# Patient Record
Sex: MALE | Race: WHITE | HISPANIC OR LATINO
[De-identification: names, ages, dates, MRNs, and addresses within clinical notes are randomized per-mention and may not be internally consistent; named-entity substitution may affect disease eponyms.]

---

## 2017-01-03 ENCOUNTER — MEDICATION RENEWAL (OUTPATIENT)
Age: 62
End: 2017-01-03

## 2017-01-04 ENCOUNTER — APPOINTMENT (OUTPATIENT)
Dept: GASTROENTEROLOGY | Facility: AMBULATORY MEDICAL SERVICES | Age: 62
End: 2017-01-04

## 2017-05-08 ENCOUNTER — APPOINTMENT (OUTPATIENT)
Dept: CARDIOLOGY | Facility: CLINIC | Age: 62
End: 2017-05-08

## 2017-05-18 ENCOUNTER — TRANSCRIPTION ENCOUNTER (OUTPATIENT)
Age: 62
End: 2017-05-18

## 2017-06-01 ENCOUNTER — APPOINTMENT (OUTPATIENT)
Dept: GASTROENTEROLOGY | Facility: CLINIC | Age: 62
End: 2017-06-01

## 2017-06-01 ENCOUNTER — FORM ENCOUNTER (OUTPATIENT)
Age: 62
End: 2017-06-01

## 2017-06-01 VITALS
TEMPERATURE: 98.6 F | HEART RATE: 83 BPM | WEIGHT: 164 LBS | BODY MASS INDEX: 23.53 KG/M2 | DIASTOLIC BLOOD PRESSURE: 70 MMHG | SYSTOLIC BLOOD PRESSURE: 110 MMHG | OXYGEN SATURATION: 98 %

## 2017-06-02 ENCOUNTER — APPOINTMENT (OUTPATIENT)
Dept: CT IMAGING | Facility: IMAGING CENTER | Age: 62
End: 2017-06-02

## 2017-06-02 ENCOUNTER — OUTPATIENT (OUTPATIENT)
Dept: OUTPATIENT SERVICES | Facility: HOSPITAL | Age: 62
LOS: 1 days | End: 2017-06-02
Payer: COMMERCIAL

## 2017-06-02 ENCOUNTER — MESSAGE (OUTPATIENT)
Age: 62
End: 2017-06-02

## 2017-06-02 DIAGNOSIS — R10.9 UNSPECIFIED ABDOMINAL PAIN: ICD-10-CM

## 2017-06-02 LAB
ALBUMIN SERPL ELPH-MCNC: 4.7 G/DL
ALP BLD-CCNC: 61 U/L
ALT SERPL-CCNC: 25 U/L
AMYLASE/CREAT SERPL: 50 U/L
ANION GAP SERPL CALC-SCNC: 15 MMOL/L
AST SERPL-CCNC: 20 U/L
BASOPHILS # BLD AUTO: 0.01 K/UL
BASOPHILS NFR BLD AUTO: 0.1 %
BILIRUB SERPL-MCNC: 0.5 MG/DL
BUN SERPL-MCNC: 13 MG/DL
CALCIUM SERPL-MCNC: 9.2 MG/DL
CHLORIDE SERPL-SCNC: 103 MMOL/L
CO2 SERPL-SCNC: 23 MMOL/L
CREAT SERPL-MCNC: 0.85 MG/DL
EOSINOPHIL # BLD AUTO: 0.06 K/UL
EOSINOPHIL NFR BLD AUTO: 0.6 %
GLUCOSE SERPL-MCNC: 101 MG/DL
HCT VFR BLD CALC: 42.3 %
HGB BLD-MCNC: 13.9 G/DL
IMM GRANULOCYTES NFR BLD AUTO: 0.2 %
LPL SERPL-CCNC: 41 U/L
LYMPHOCYTES # BLD AUTO: 1.23 K/UL
LYMPHOCYTES NFR BLD AUTO: 12.2 %
MAN DIFF?: NORMAL
MCHC RBC-ENTMCNC: 29.1 PG
MCHC RBC-ENTMCNC: 32.9 GM/DL
MCV RBC AUTO: 88.7 FL
MONOCYTES # BLD AUTO: 0.66 K/UL
MONOCYTES NFR BLD AUTO: 6.5 %
NEUTROPHILS # BLD AUTO: 8.1 K/UL
NEUTROPHILS NFR BLD AUTO: 80.4 %
PLATELET # BLD AUTO: 275 K/UL
POTASSIUM SERPL-SCNC: 4.1 MMOL/L
PROT SERPL-MCNC: 7.5 G/DL
RBC # BLD: 4.77 M/UL
RBC # FLD: 13 %
SODIUM SERPL-SCNC: 141 MMOL/L
WBC # FLD AUTO: 10.08 K/UL

## 2017-06-02 PROCEDURE — 82565 ASSAY OF CREATININE: CPT

## 2017-06-02 PROCEDURE — 74177 CT ABD & PELVIS W/CONTRAST: CPT

## 2017-06-06 ENCOUNTER — MESSAGE (OUTPATIENT)
Age: 62
End: 2017-06-06

## 2017-06-16 ENCOUNTER — APPOINTMENT (OUTPATIENT)
Dept: GASTROENTEROLOGY | Facility: CLINIC | Age: 62
End: 2017-06-16

## 2017-06-16 VITALS
WEIGHT: 160 LBS | BODY MASS INDEX: 22.9 KG/M2 | TEMPERATURE: 98 F | DIASTOLIC BLOOD PRESSURE: 62 MMHG | HEIGHT: 70 IN | SYSTOLIC BLOOD PRESSURE: 110 MMHG | RESPIRATION RATE: 16 BRPM | OXYGEN SATURATION: 99 % | HEART RATE: 88 BPM

## 2017-08-10 ENCOUNTER — APPOINTMENT (OUTPATIENT)
Dept: RADIOLOGY | Facility: IMAGING CENTER | Age: 62
End: 2017-08-10
Payer: COMMERCIAL

## 2017-08-10 ENCOUNTER — APPOINTMENT (OUTPATIENT)
Dept: PULMONOLOGY | Facility: CLINIC | Age: 62
End: 2017-08-10
Payer: COMMERCIAL

## 2017-08-10 ENCOUNTER — OUTPATIENT (OUTPATIENT)
Dept: OUTPATIENT SERVICES | Facility: HOSPITAL | Age: 62
LOS: 1 days | End: 2017-08-10
Payer: COMMERCIAL

## 2017-08-10 VITALS
RESPIRATION RATE: 16 BRPM | WEIGHT: 165 LBS | DIASTOLIC BLOOD PRESSURE: 80 MMHG | TEMPERATURE: 97.3 F | SYSTOLIC BLOOD PRESSURE: 120 MMHG | HEIGHT: 70 IN | HEART RATE: 73 BPM | BODY MASS INDEX: 23.62 KG/M2

## 2017-08-10 DIAGNOSIS — R93.8 ABNORMAL FINDINGS ON DIAGNOSTIC IMAGING OF OTHER SPECIFIED BODY STRUCTURES: ICD-10-CM

## 2017-08-10 DIAGNOSIS — Z00.00 ENCOUNTER FOR GENERAL ADULT MEDICAL EXAMINATION WITHOUT ABNORMAL FINDINGS: ICD-10-CM

## 2017-08-10 PROCEDURE — 94010 BREATHING CAPACITY TEST: CPT

## 2017-08-10 PROCEDURE — 94729 DIFFUSING CAPACITY: CPT

## 2017-08-10 PROCEDURE — 94726 PLETHYSMOGRAPHY LUNG VOLUMES: CPT

## 2017-08-10 PROCEDURE — ZZZZZ: CPT

## 2017-08-10 PROCEDURE — 99214 OFFICE O/P EST MOD 30 MIN: CPT | Mod: 25

## 2017-08-10 PROCEDURE — 71020: CPT | Mod: 26

## 2017-08-10 PROCEDURE — 71046 X-RAY EXAM CHEST 2 VIEWS: CPT

## 2017-08-18 ENCOUNTER — APPOINTMENT (OUTPATIENT)
Dept: NEUROLOGY | Facility: CLINIC | Age: 62
End: 2017-08-18
Payer: COMMERCIAL

## 2017-08-18 VITALS
HEIGHT: 70.5 IN | SYSTOLIC BLOOD PRESSURE: 125 MMHG | BODY MASS INDEX: 22.93 KG/M2 | HEART RATE: 71 BPM | DIASTOLIC BLOOD PRESSURE: 92 MMHG | WEIGHT: 162 LBS

## 2017-08-18 PROCEDURE — 99204 OFFICE O/P NEW MOD 45 MIN: CPT

## 2017-08-21 ENCOUNTER — APPOINTMENT (OUTPATIENT)
Dept: PULMONOLOGY | Facility: CLINIC | Age: 62
End: 2017-08-21

## 2017-10-09 ENCOUNTER — RX RENEWAL (OUTPATIENT)
Age: 62
End: 2017-10-09

## 2018-06-07 ENCOUNTER — APPOINTMENT (OUTPATIENT)
Dept: GASTROENTEROLOGY | Facility: CLINIC | Age: 63
End: 2018-06-07

## 2018-06-08 ENCOUNTER — APPOINTMENT (OUTPATIENT)
Dept: GASTROENTEROLOGY | Facility: CLINIC | Age: 63
End: 2018-06-08
Payer: COMMERCIAL

## 2018-06-08 VITALS
OXYGEN SATURATION: 99 % | SYSTOLIC BLOOD PRESSURE: 122 MMHG | BODY MASS INDEX: 31.8 KG/M2 | WEIGHT: 162 LBS | DIASTOLIC BLOOD PRESSURE: 74 MMHG | HEIGHT: 60 IN | RESPIRATION RATE: 15 BRPM | HEART RATE: 70 BPM | TEMPERATURE: 97.9 F

## 2018-06-08 DIAGNOSIS — R10.9 UNSPECIFIED ABDOMINAL PAIN: ICD-10-CM

## 2018-06-08 DIAGNOSIS — K59.00 CONSTIPATION, UNSPECIFIED: ICD-10-CM

## 2018-06-08 DIAGNOSIS — K64.8 OTHER HEMORRHOIDS: ICD-10-CM

## 2018-06-08 PROCEDURE — 99214 OFFICE O/P EST MOD 30 MIN: CPT

## 2019-01-02 ENCOUNTER — APPOINTMENT (OUTPATIENT)
Dept: UROLOGY | Facility: CLINIC | Age: 64
End: 2019-01-02

## 2019-02-05 ENCOUNTER — APPOINTMENT (OUTPATIENT)
Dept: ORTHOPEDIC SURGERY | Facility: CLINIC | Age: 64
End: 2019-02-05

## 2019-06-20 ENCOUNTER — APPOINTMENT (OUTPATIENT)
Dept: OPHTHALMOLOGY | Facility: CLINIC | Age: 64
End: 2019-06-20
Payer: COMMERCIAL

## 2019-06-20 ENCOUNTER — NON-APPOINTMENT (OUTPATIENT)
Age: 64
End: 2019-06-20

## 2019-06-20 DIAGNOSIS — H40.003 PREGLAUCOMA, UNSPECIFIED, BILATERAL: ICD-10-CM

## 2019-06-20 DIAGNOSIS — H25.093 OTHER AGE-RELATED INCIPIENT CATARACT, BILATERAL: ICD-10-CM

## 2019-06-20 DIAGNOSIS — H35.373 PUCKERING OF MACULA, BILATERAL: ICD-10-CM

## 2019-06-20 PROCEDURE — 76514 ECHO EXAM OF EYE THICKNESS: CPT

## 2019-06-20 PROCEDURE — 92004 COMPRE OPH EXAM NEW PT 1/>: CPT

## 2019-06-20 PROCEDURE — 92133 CPTRZD OPH DX IMG PST SGM ON: CPT

## 2019-06-24 ENCOUNTER — NON-APPOINTMENT (OUTPATIENT)
Age: 64
End: 2019-06-24

## 2019-06-24 ENCOUNTER — APPOINTMENT (OUTPATIENT)
Dept: OPHTHALMOLOGY | Facility: CLINIC | Age: 64
End: 2019-06-24
Payer: COMMERCIAL

## 2019-06-24 PROCEDURE — 92083 EXTENDED VISUAL FIELD XM: CPT

## 2019-07-09 ENCOUNTER — APPOINTMENT (OUTPATIENT)
Dept: NEUROLOGY | Facility: CLINIC | Age: 64
End: 2019-07-09
Payer: COMMERCIAL

## 2019-07-09 VITALS
SYSTOLIC BLOOD PRESSURE: 113 MMHG | DIASTOLIC BLOOD PRESSURE: 76 MMHG | BODY MASS INDEX: 25.48 KG/M2 | HEIGHT: 70 IN | HEART RATE: 80 BPM | WEIGHT: 178 LBS

## 2019-07-09 PROCEDURE — 99213 OFFICE O/P EST LOW 20 MIN: CPT

## 2019-07-09 NOTE — ASSESSMENT
[FreeTextEntry1] : Patient continues to have what sounds to be migraine equivalents, in the setting of a normal neuro examination.  \par \par Patient agrees to try elavil 10mg QHS and see if that helps\par \par

## 2019-07-09 NOTE — HISTORY OF PRESENT ILLNESS
[FreeTextEntry1] : Last saw patient two years ago for migraine equivalent, he deferred treatment at the time.  \par \par For the past 6 months he has been getting tingling left face and left arm about once weekly, lasting about two hours and more recently for three days, and now into left index fingers.  Gets very mild HA about every three months.  Left arm is not week, no change in urinary freq. No vision loss.

## 2019-07-09 NOTE — PHYSICAL EXAM
[Person] : oriented to person [Place] : oriented to place [Remote Intact] : remote memory intact [Short Term Intact] : short term memory intact [Time] : oriented to time [Registration Intact] : recent registration memory intact [Visual Intact] : visual attention was ~T not ~L decreased [Concentration Intact] : normal concentrating ability [Writing A Sentence] : no difficulty writing a sentence [Naming Objects] : no difficulty naming common objects [Repeating Phrases] : no difficulty repeating a phrase [Reading] : reading intact [Fluency] : fluency intact [Comprehension] : comprehension intact [Cranial Nerves Optic (II)] : visual acuity intact bilaterally,  visual fields full to confrontation, pupils equal round and reactive to light [Past History] : adequate knowledge of personal past history [Cranial Nerves Trigeminal (V)] : facial sensation intact symmetrically [Cranial Nerves Oculomotor (III)] : extraocular motion intact [Cranial Nerves Facial (VII)] : face symmetrical [Cranial Nerves Glossopharyngeal (IX)] : tongue and palate midline [Cranial Nerves Accessory (XI - Cranial And Spinal)] : head turning and shoulder shrug symmetric [Cranial Nerves Vestibulocochlear (VIII)] : hearing was intact bilaterally [No Muscle Atrophy] : normal bulk in all four extremities [Motor Strength] : muscle strength was normal in all four extremities [Motor Tone] : muscle tone was normal in all four extremities [Cranial Nerves Hypoglossal (XII)] : there was no tongue deviation with protrusion [Sensation Tactile Decrease] : light touch was intact [Motor Handedness Right-Handed] : the patient is right hand dominant [Balance] : balance was intact [Abnormal Walk] : normal gait [2+] : Patella left 2+ [Past-pointing] : there was no past-pointing [Tremor] : no tremor present [Plantar Reflex Right Only] : normal on the right [Plantar Reflex Left Only] : normal on the left

## 2019-08-06 ENCOUNTER — APPOINTMENT (OUTPATIENT)
Dept: OPHTHALMOLOGY | Facility: CLINIC | Age: 64
End: 2019-08-06
Payer: COMMERCIAL

## 2019-08-06 ENCOUNTER — NON-APPOINTMENT (OUTPATIENT)
Age: 64
End: 2019-08-06

## 2019-08-06 PROCEDURE — 92014 COMPRE OPH EXAM EST PT 1/>: CPT

## 2019-08-06 PROCEDURE — 92083 EXTENDED VISUAL FIELD XM: CPT

## 2019-08-06 PROCEDURE — 92133 CPTRZD OPH DX IMG PST SGM ON: CPT

## 2019-08-06 PROCEDURE — ZZZZZ: CPT

## 2019-08-21 ENCOUNTER — FORM ENCOUNTER (OUTPATIENT)
Age: 64
End: 2019-08-21

## 2019-08-22 ENCOUNTER — OUTPATIENT (OUTPATIENT)
Dept: OUTPATIENT SERVICES | Facility: HOSPITAL | Age: 64
LOS: 1 days | End: 2019-08-22
Payer: COMMERCIAL

## 2019-08-22 ENCOUNTER — APPOINTMENT (OUTPATIENT)
Dept: ULTRASOUND IMAGING | Facility: CLINIC | Age: 64
End: 2019-08-22
Payer: COMMERCIAL

## 2019-08-22 DIAGNOSIS — Z00.8 ENCOUNTER FOR OTHER GENERAL EXAMINATION: ICD-10-CM

## 2019-08-22 PROCEDURE — 93880 EXTRACRANIAL BILAT STUDY: CPT

## 2019-08-22 PROCEDURE — 93880 EXTRACRANIAL BILAT STUDY: CPT | Mod: 26

## 2019-08-28 ENCOUNTER — RESULT REVIEW (OUTPATIENT)
Age: 64
End: 2019-08-28

## 2019-09-03 ENCOUNTER — APPOINTMENT (OUTPATIENT)
Dept: ORTHOPEDIC SURGERY | Facility: CLINIC | Age: 64
End: 2019-09-03
Payer: COMMERCIAL

## 2019-09-03 DIAGNOSIS — M54.12 RADICULOPATHY, CERVICAL REGION: ICD-10-CM

## 2019-09-03 DIAGNOSIS — G57.02 LESION OF SCIATIC NERVE, LEFT LOWER LIMB: ICD-10-CM

## 2019-09-03 PROCEDURE — 99204 OFFICE O/P NEW MOD 45 MIN: CPT

## 2019-09-03 NOTE — PHYSICAL EXAM
[de-identified] : Constitutional: Well-nourished, well-developed, No acute distress\par Respiratory:  Good respiratory effort, no SOB\par Lymphatic: No regional lymphadenopathy, no lymphedema\par Psychiatric: Pleasant and normal affect, alert and oriented x3\par Skin: Clean dry and intact B/L UE/LE\par Musculoskeletal: normal except where as noted in regional exam\par \par Cervical Spine Exam\par APPEARANCE: no marked deformities or malalignment, normal curvature, good posture\par POSITIVE TENDERNESS: + Bilateral upper trapezius, levator scapula, rhomboid major, and rhomboid minor, + spasm noted in the same muscles.\par NONTENDER: no bony midline tenderness.\par ROM: limited in all planes, most notably in flexion and sidebending due to pain\par RESISTIVE TESTING: painful 4/5 resisted ext, bilateral sidebending, rotation and shoulder shrug , 5/5 flexion \par SPECIAL TESTS: neg Spurling's b/l\par Vasc: 2+ radial pulse b/l\par Neuro: C5 - T1 intact to motor, DTRs 2+/4 biceps, triceps, brachioradialis\par Sensation: Intact to light touch throughout b/l UE\par Thoracic spine:  normal curvature and normal alignment. good posture. no midline bony tenderness, no marked spasm. no marked tenderness in paraspinal muscles.  ROM full & painless all planes\par B/L Shoulders:  No asymmetry, malalignment, or swelling, Full ROM, 5/5 strength in flexion/ext, Abd/Add, IR/ER, Joints stable\par B/L Elbows:  No asymmetry, malalignment, or swelling, Full ROM, 5/5 strength in flexion/ext, pronation/supination, Joints stable\par B/L Wrist and Hand:  No asymmetry, malalignment, or swelling, Full ROM, 5/5 strength in wrist and long finger flexion/ext, radial/ulnar deviation, Joints stable\par \par \par \par Right Hip:\par \par APPEARANCE: no marked deformities, no swelling or malalignment\par POSITIVE TENDERNESS: none\par NONTENDER greater trochanter, TFL, gluteal region, ischium/proximal hamstring region, hip flexor region, sartorius and pubic symphysis. \par ROM full, painless all planes. \par RESISTIVE TESTING: painless resisted ER/IR/SLR/abduction/adduction. \par SPECIAL TESTS: painless loaded flexion & scouring\par PULSES: 2+ DP/PT pulses\par Neuro:  NL sensation of thigh and lower extremity, DTRs 2+/4 patella and achilles\par \par \par B/L Knees: No asymmetry, malalignment, or swelling, Full ROM, 5/5 strength in flexion/ext, Joints stable\par B/L Ankles: No asymmetry, malalignment, or swelling, Full ROM, 5/5 strength in DF/PF/Inv/Ev, Joints stable\par BIOMECHANICAL EXAM: no marked leg length discrepancy, moderate hip abductor weakness b/l, no marked foot pronation, tight hams and ITB b/l.  Normal gait and station\par \par Left Hip:\par \par APPEARANCE: no marked deformities, no swelling or malalignment\par POSITIVE TENDERNESS: + Piriformis at midsubstance and at its insertion on the greater trochanter\par NONTENDER: greater trochanter, trochanteric bursa, TFL, gluteal region, ischium/proximal hamstring region, hip flexor region, sartorius and pubic symphysis. \par ROM: full & painless. \par RESISTIVE TESTING: Mild pain with resisted hip ER, particularly at 90 of hip flexion, painless resisted IR/SLR/abduction/adduction. \par SPECIAL TESTS: NATHANIEL reproduces mild pain and tightness in the buttock region, neg FADIR, painless loaded flexion & scouring. neg hop test & fulcrum test\par PULSES: 2+ DP/PT pulses\par Neuro:  NL sensation of thigh and lower extremity, DTRs 2+/4 patella and achilles\par \par

## 2019-09-03 NOTE — HISTORY OF PRESENT ILLNESS
[de-identified] : Patient is here for left leg pain that began several months ago when he fell while running and twisted his leg. He states that the pain has improved over time with no treatment but he continues to have some pain when he is driving for extended periods of time. He states that if he is in the car for over 2 hours he will get radiating pain with occasional numbness and tingling. \par \par He also complains of numbness/tingling on the left side of his upper body/jaw. He states that he has seen 2 neurologists for this in the past, his most recent visit was in 07/2019

## 2019-09-03 NOTE — DISCUSSION/SUMMARY
[de-identified] : Discussed findings of today's exam and possible causes of patient's pain.  Educated patient on their most probable diagnosis of left buttock pain with intermittent lower extremity radiculopathy, likely secondary to piriformis syndrome.  Reviewed possible courses of treatment, and we collaboratively decided best course of treatment at this time will include conservative management.  Patient will be started on a course of physical therapy to restore normal range of motion and strength as tolerated.\par Patient is also requesting evaluation of chronic left-sided face and neck/upper extremity pain. He was recently evaluated by neurology and told he has slightly migraine headaches with possible nerve irritation subsequent to that etiology. I advised the patient that cervical disc herniation or etiology can cause left upper extremity pain, but it does not cause facial numbness. That is above the level of the cervical spine. Recommend addition a cervical spine issue to his course of physical therapy, if this helps improve his left upper extremity issue, that would confirm that he has some level of cervical etiology. If not we may consider x-ray and/or MRI of the cervical spine to evaluate for left-sided herniation, with no structural etiology found to be at advised to continue management with neurology.  Follow up as needed.  Patient appreciates and agrees with current plan.\par \par This note was generated using dragon medical dictation software.  A reasonable effort has been made for proofreading its contents, but typos may still remain.  If there are any questions or points of clarification needed please notify my office.

## 2019-12-16 ENCOUNTER — APPOINTMENT (OUTPATIENT)
Dept: OTOLARYNGOLOGY | Facility: CLINIC | Age: 64
End: 2019-12-16
Payer: COMMERCIAL

## 2019-12-16 VITALS
HEIGHT: 70 IN | HEART RATE: 83 BPM | SYSTOLIC BLOOD PRESSURE: 124 MMHG | DIASTOLIC BLOOD PRESSURE: 82 MMHG | BODY MASS INDEX: 25.48 KG/M2 | WEIGHT: 178 LBS

## 2019-12-16 PROCEDURE — 92557 COMPREHENSIVE HEARING TEST: CPT

## 2019-12-16 PROCEDURE — 99204 OFFICE O/P NEW MOD 45 MIN: CPT | Mod: 25

## 2019-12-16 PROCEDURE — 92567 TYMPANOMETRY: CPT

## 2019-12-16 NOTE — DATA REVIEWED
[de-identified] : left SNHL w/ excellent SDS\par \par Hearing Test performed to evaluate the extent of hearing loss and asymmetry to explain pt's symptoms\par

## 2019-12-16 NOTE — REASON FOR VISIT
[Initial Evaluation] : an initial evaluation for [Hearing Loss] : hearing loss [Tinnitus] : tinnitus [Vertigo] : vertigo

## 2019-12-16 NOTE — PHYSICAL EXAM
[Hearing Loss Right Only] : normal [Hearing Loss Left Only] : normal [Nystagmus] : ~T no ~M nystagmus was seen [Fistula Sign] : Fistula Sign: Negative [Fukuda Step Test] : Fukuda Step Test was Negative [Bibi-Hallandréske] : [place]-Hallpike: Negative [de-identified] : wnl [] : septum deviated to the left [Midline] : trachea located in midline position [Normal] : no rashes

## 2019-12-16 NOTE — ASSESSMENT
[FreeTextEntry1] : Left labyrinthitis and left SNHL\par Rx-medrol dospak\par May need a 2 week taper of Prednisone and MRI\par rec low salt diet and avoid loud noises\par f/u in several days

## 2019-12-23 ENCOUNTER — FORM ENCOUNTER (OUTPATIENT)
Age: 64
End: 2019-12-23

## 2019-12-23 ENCOUNTER — APPOINTMENT (OUTPATIENT)
Dept: OTOLARYNGOLOGY | Facility: CLINIC | Age: 64
End: 2019-12-23
Payer: COMMERCIAL

## 2019-12-23 VITALS
DIASTOLIC BLOOD PRESSURE: 84 MMHG | HEART RATE: 72 BPM | BODY MASS INDEX: 25.48 KG/M2 | WEIGHT: 178 LBS | HEIGHT: 70 IN | SYSTOLIC BLOOD PRESSURE: 123 MMHG

## 2019-12-23 PROCEDURE — 99214 OFFICE O/P EST MOD 30 MIN: CPT

## 2019-12-23 NOTE — HISTORY OF PRESENT ILLNESS
[No] : patient does not have a  history of radiation therapy [de-identified] : 12/23/19-pt reports temporary improvement of sx after 2 days of steroids then sx returned in full\par 12/16/1962 yo male\par 1-2 day h/o moderate vertigo and imbalance w/ assoc left sided hearing loss and tinnitus\par no prior h/o of same\par no trauma\par No recent URI or salty meal\par no other modifying factors\par no nasal or throat complaints\par  [Tinnitus] : tinnitus [Vertigo] : vertigo [Anxiety] : no anxiety [Hearing Loss] : hearing loss [Headache] : no headache [Neurologic Symptoms] : no associated neurologic symptoms [Orthostatic Hypotension] : no orthostatic hypotension [Otalgia] : no otalgia [Recurrent Otitis Media] : no recurrent otitis media [Meniere Disease] : no Meniere disease [Otitis Media with Effusion] : no otitis media with effusion [Eustachian Tube Dysfunction] : no eustachian tube dysfunction [Cholesteatoma] : no cholesteatoma [Otosclerosis] : no otosclerosis [Hypertension] : no hypertension [Autoimmune Diseases] : no autoimmune diseases [Perilymphatic Fistula] : no perilymphatic fistula [Loud Noise Exposure] : no history of loud noise exposure [Hypotension] : no hypotension [Smoking] : no smoking [Stroke] : no stroke [Early Onset Hearing Loss] : no early onset hearing loss [Facial Pain] : no facial pain [Facial Pressure] : no facial pressure [Nasal Congestion] : no nasal congestion [Diplopia] : no diplopia [Ear Fullness] : no ear fullness [Allergic Rhinitis] : no allergic rhinitis [Septal Deviation] : septal deviation [Seasonal Allergies] : no seasonal allergies [Environmental Allergies] : no environmental allergies [Allergies] : no allergies [Environmental Allergens] : no environmental allergens [Adenoidectomy] : no adenoidectomy [Asthma] : no asthma [Neck Mass] : no neck mass [Neck Pain] : no neck pain [Chills] : no chills [Cold Intolerance] : no cold intolerance [Cough] : no cough [Fatigue] : no fatigue [Hyperthyroidism] : no hyperthyroidism [Heat Intolerance] : no heat intolerance [Sialadenitis] : no sialadenitis [Hodgkin Disease] : no hodgkin disease [Non-Hodgkin Lymphoma] : no non-hodgkin lymphoma [Tobacco Use] : no tobacco use [Alcohol Use] : no alcohol use [Thyroid Cancer] : no thyroid cancer [Graves Disease] : no graves disease

## 2019-12-23 NOTE — REASON FOR VISIT
[Subsequent Evaluation] : a subsequent evaluation for [Tinnitus] : tinnitus [Hearing Loss] : hearing loss [Vertigo] : vertigo

## 2019-12-23 NOTE — DATA REVIEWED
[de-identified] : left SNHL w/ excellent SDS\par \par Hearing Test performed to evaluate the extent of hearing loss and asymmetry to explain pt's symptoms\par

## 2019-12-23 NOTE — PHYSICAL EXAM
[] : septum deviated to the left [Midline] : trachea located in midline position [Hearing Loss Left Only] : normal [Hearing Loss Right Only] : normal [Nystagmus] : ~T no ~M nystagmus was seen [Normal] : horizontal positional vertigo maneuver was normal [Fukuda Step Test] : Fukuda Step Test was Negative [Bibi-Hallandréske] : [place]-Hallpike: Negative [Fistula Sign] : Fistula Sign: Negative [de-identified] : wnl

## 2019-12-23 NOTE — ASSESSMENT
[FreeTextEntry1] : Left labyrinthitis and left SNHL\par Rx-2 steroid taper 60 mg first week and tums BID\par   MRI of IAC's\par rec low salt diet and avoid loud noises\par f/u in 2 weeks

## 2019-12-24 ENCOUNTER — OUTPATIENT (OUTPATIENT)
Dept: OUTPATIENT SERVICES | Facility: HOSPITAL | Age: 64
LOS: 1 days | End: 2019-12-24
Payer: COMMERCIAL

## 2019-12-24 ENCOUNTER — APPOINTMENT (OUTPATIENT)
Dept: MRI IMAGING | Facility: CLINIC | Age: 64
End: 2019-12-24
Payer: COMMERCIAL

## 2019-12-24 DIAGNOSIS — Z00.8 ENCOUNTER FOR OTHER GENERAL EXAMINATION: ICD-10-CM

## 2019-12-24 PROCEDURE — A9585: CPT

## 2019-12-24 PROCEDURE — 70553 MRI BRAIN STEM W/O & W/DYE: CPT | Mod: 26

## 2019-12-24 PROCEDURE — 70553 MRI BRAIN STEM W/O & W/DYE: CPT

## 2019-12-30 ENCOUNTER — APPOINTMENT (OUTPATIENT)
Dept: OTOLARYNGOLOGY | Facility: CLINIC | Age: 64
End: 2019-12-30

## 2020-01-08 ENCOUNTER — APPOINTMENT (OUTPATIENT)
Dept: OTOLARYNGOLOGY | Facility: CLINIC | Age: 65
End: 2020-01-08
Payer: COMMERCIAL

## 2020-01-08 VITALS
HEIGHT: 70 IN | BODY MASS INDEX: 25.48 KG/M2 | WEIGHT: 178 LBS | SYSTOLIC BLOOD PRESSURE: 122 MMHG | HEART RATE: 105 BPM | DIASTOLIC BLOOD PRESSURE: 80 MMHG

## 2020-01-08 PROCEDURE — 92557 COMPREHENSIVE HEARING TEST: CPT

## 2020-01-08 PROCEDURE — 99214 OFFICE O/P EST MOD 30 MIN: CPT | Mod: 25

## 2020-01-08 PROCEDURE — 92567 TYMPANOMETRY: CPT

## 2020-01-08 NOTE — REASON FOR VISIT
[Subsequent Evaluation] : a subsequent evaluation for [Hearing Loss] : hearing loss [Tinnitus] : tinnitus [Vertigo] : vertigo

## 2020-01-08 NOTE — ASSESSMENT
[FreeTextEntry1] : Left labyrinthitis and left SNHL r/o Meniere's\par Rx-Acupuncture\par Lipoflavanoid\par low salt diet\par  dyazide\par mult questions answered\par f/u 1 month

## 2020-01-08 NOTE — PHYSICAL EXAM
[] : septum deviated to the left [Midline] : trachea located in midline position [Normal] : gait was normal [Nystagmus] : ~T no ~M nystagmus was seen [Fukuda Step Test] : Fukuda Step Test was Negative [Fistula Sign] : Fistula Sign: Negative [de-identified] : wnl [Bibi-Hallandréske] : [place]-Hallpike: Negative

## 2020-01-08 NOTE — DATA REVIEWED
[de-identified] : left SNHL w/ excellent SDS-no change fro 2 weeks ago\par \par Hearing Test performed to evaluate the extent of hearing loss and asymmetry to explain pt's symptoms\par  [de-identified] : MRI-wnl

## 2020-01-08 NOTE — HISTORY OF PRESENT ILLNESS
[No] : patient does not have a  history of radiation therapy [Vertigo] : vertigo [Tinnitus] : tinnitus [Hearing Loss] : hearing loss [Septal Deviation] : septal deviation [de-identified] : 1/8/2020-vertigo resolved w/ 2 week of steroid tx\par stilll w/ left tinnitus and aural fullness\par poss hearing improved\par 12/23/19-pt reports temporary improvement of sx after 2 days of steroids then sx returned in full\par 12/16/1962 yo male\par 1-2 day h/o moderate vertigo and imbalance w/ assoc left sided hearing loss and tinnitus\par no prior h/o of same\par no trauma\par No recent URI or salty meal\par no other modifying factors\par no nasal or throat complaints\par  [Anxiety] : no anxiety [Headache] : no headache [Neurologic Symptoms] : no associated neurologic symptoms [Orthostatic Hypotension] : no orthostatic hypotension [Otalgia] : no otalgia [Recurrent Otitis Media] : no recurrent otitis media [Otitis Media with Effusion] : no otitis media with effusion [Meniere Disease] : no Meniere disease [Eustachian Tube Dysfunction] : no eustachian tube dysfunction [Otosclerosis] : no otosclerosis [Cholesteatoma] : no cholesteatoma [Perilymphatic Fistula] : no perilymphatic fistula [Autoimmune Diseases] : no autoimmune diseases [Hypertension] : no hypertension [Hypotension] : no hypotension [Loud Noise Exposure] : no history of loud noise exposure [Smoking] : no smoking [Early Onset Hearing Loss] : no early onset hearing loss [Stroke] : no stroke [Facial Pain] : no facial pain [Facial Pressure] : no facial pressure [Nasal Congestion] : no nasal congestion [Diplopia] : no diplopia [Ear Fullness] : no ear fullness [Allergic Rhinitis] : no allergic rhinitis [Environmental Allergies] : no environmental allergies [Seasonal Allergies] : no seasonal allergies [Environmental Allergens] : no environmental allergens [Adenoidectomy] : no adenoidectomy [Asthma] : no asthma [Allergies] : no allergies [Neck Mass] : no neck mass [Neck Pain] : no neck pain [Chills] : no chills [Cold Intolerance] : no cold intolerance [Cough] : no cough [Fatigue] : no fatigue [Heat Intolerance] : no heat intolerance [Hyperthyroidism] : no hyperthyroidism [Sialadenitis] : no sialadenitis [Hodgkin Disease] : no hodgkin disease [Non-Hodgkin Lymphoma] : no non-hodgkin lymphoma [Tobacco Use] : no tobacco use [Alcohol Use] : no alcohol use [Graves Disease] : no graves disease [Thyroid Cancer] : no thyroid cancer

## 2020-01-20 ENCOUNTER — MESSAGE (OUTPATIENT)
Age: 65
End: 2020-01-20

## 2020-01-27 ENCOUNTER — APPOINTMENT (OUTPATIENT)
Dept: OPHTHALMOLOGY | Facility: CLINIC | Age: 65
End: 2020-01-27

## 2020-02-03 RX ORDER — AMITRIPTYLINE HYDROCHLORIDE 10 MG/1
10 TABLET, FILM COATED ORAL
Qty: 30 | Refills: 5 | Status: ACTIVE | COMMUNITY
Start: 2019-07-09 | End: 1900-01-01

## 2020-02-10 ENCOUNTER — APPOINTMENT (OUTPATIENT)
Dept: NEUROLOGY | Facility: CLINIC | Age: 65
End: 2020-02-10
Payer: COMMERCIAL

## 2020-02-10 VITALS
DIASTOLIC BLOOD PRESSURE: 84 MMHG | HEART RATE: 81 BPM | WEIGHT: 179 LBS | HEIGHT: 70 IN | BODY MASS INDEX: 25.62 KG/M2 | SYSTOLIC BLOOD PRESSURE: 143 MMHG

## 2020-02-10 PROCEDURE — 99213 OFFICE O/P EST LOW 20 MIN: CPT

## 2020-02-10 NOTE — PHYSICAL EXAM
[Person] : oriented to person [Place] : oriented to place [Time] : oriented to time [Short Term Intact] : short term memory intact [Remote Intact] : remote memory intact [Registration Intact] : recent registration memory intact [Concentration Intact] : normal concentrating ability [Visual Intact] : visual attention was ~T not ~L decreased [Repeating Phrases] : no difficulty repeating a phrase [Naming Objects] : no difficulty naming common objects [Writing A Sentence] : no difficulty writing a sentence [Comprehension] : comprehension intact [Fluency] : fluency intact [Cranial Nerves Optic (II)] : visual acuity intact bilaterally,  visual fields full to confrontation, pupils equal round and reactive to light [Past History] : adequate knowledge of personal past history [Reading] : reading intact [Cranial Nerves Oculomotor (III)] : extraocular motion intact [Cranial Nerves Vestibulocochlear (VIII)] : hearing was intact bilaterally [Cranial Nerves Trigeminal (V)] : facial sensation intact symmetrically [Cranial Nerves Facial (VII)] : face symmetrical [Cranial Nerves Accessory (XI - Cranial And Spinal)] : head turning and shoulder shrug symmetric [Cranial Nerves Glossopharyngeal (IX)] : tongue and palate midline [Cranial Nerves Hypoglossal (XII)] : there was no tongue deviation with protrusion [Motor Tone] : muscle tone was normal in all four extremities [Motor Strength] : muscle strength was normal in all four extremities [No Muscle Atrophy] : normal bulk in all four extremities [Sensation Tactile Decrease] : light touch was intact [Motor Handedness Right-Handed] : the patient is right hand dominant [Balance] : balance was intact [Abnormal Walk] : normal gait [Tremor] : no tremor present [Past-pointing] : there was no past-pointing [2+] : Ankle jerk right 2+ [Plantar Reflex Right Only] : normal on the right [Plantar Reflex Left Only] : normal on the left

## 2020-02-10 NOTE — HISTORY OF PRESENT ILLNESS
[FreeTextEntry1] : Patient did not try the elavil after last visit in July because the symptoms went away.  Recently HA recurred with tingling both sides, worse with stormy weather.  He tolerates elavil, sleeps slightly better, no SE's.  \par \par HA's mild and go away with ASA

## 2020-02-24 ENCOUNTER — APPOINTMENT (OUTPATIENT)
Dept: OTOLARYNGOLOGY | Facility: CLINIC | Age: 65
End: 2020-02-24

## 2020-03-06 ENCOUNTER — APPOINTMENT (OUTPATIENT)
Dept: OPHTHALMOLOGY | Facility: CLINIC | Age: 65
End: 2020-03-06
Payer: COMMERCIAL

## 2020-03-06 ENCOUNTER — NON-APPOINTMENT (OUTPATIENT)
Age: 65
End: 2020-03-06

## 2020-03-06 PROCEDURE — 92083 EXTENDED VISUAL FIELD XM: CPT

## 2020-03-06 PROCEDURE — 92012 INTRM OPH EXAM EST PATIENT: CPT

## 2020-03-06 PROCEDURE — 92133 CPTRZD OPH DX IMG PST SGM ON: CPT

## 2020-04-25 ENCOUNTER — MESSAGE (OUTPATIENT)
Age: 65
End: 2020-04-25

## 2020-05-08 ENCOUNTER — APPOINTMENT (OUTPATIENT)
Dept: DISASTER EMERGENCY | Facility: CLINIC | Age: 65
End: 2020-05-08

## 2020-05-09 LAB
SARS-COV-2 IGG SERPL IA-ACNC: <0.1 INDEX
SARS-COV-2 IGG SERPL QL IA: NEGATIVE

## 2020-05-22 ENCOUNTER — APPOINTMENT (OUTPATIENT)
Dept: NEUROLOGY | Facility: CLINIC | Age: 65
End: 2020-05-22

## 2020-06-01 ENCOUNTER — APPOINTMENT (OUTPATIENT)
Dept: OTOLARYNGOLOGY | Facility: CLINIC | Age: 65
End: 2020-06-01
Payer: COMMERCIAL

## 2020-06-01 VITALS
DIASTOLIC BLOOD PRESSURE: 78 MMHG | TEMPERATURE: 98 F | WEIGHT: 178 LBS | HEART RATE: 77 BPM | SYSTOLIC BLOOD PRESSURE: 122 MMHG | BODY MASS INDEX: 25.48 KG/M2 | HEIGHT: 70 IN

## 2020-06-01 DIAGNOSIS — G43.109 MIGRAINE WITH AURA, NOT INTRACTABLE, W/OUT STATUS MIGRAINOSUS: ICD-10-CM

## 2020-06-01 DIAGNOSIS — H90.3 SENSORINEURAL HEARING LOSS, BILATERAL: ICD-10-CM

## 2020-06-01 DIAGNOSIS — M26.609 UNSPECIFIED TEMPOROMANDIBULAR JOINT DISORDER: ICD-10-CM

## 2020-06-01 DIAGNOSIS — H93.12 TINNITUS, LEFT EAR: ICD-10-CM

## 2020-06-01 DIAGNOSIS — J34.2 DEVIATED NASAL SEPTUM: ICD-10-CM

## 2020-06-01 PROCEDURE — 99214 OFFICE O/P EST MOD 30 MIN: CPT

## 2020-06-01 NOTE — ASSESSMENT
[FreeTextEntry1] : Left SNHL and tinnitus:\par -cleared for hearing aids \par -Avoid loud noise exposure \par stress reduction\par \par TMJ:\par -soft food diet \par -dental evaluation \par -Tylenol for pain \par -warm and cold compresses\par \par f/u 6 months or prn

## 2020-06-01 NOTE — DATA REVIEWED
[de-identified] : left SNHL w/ excellent SDS-no change fro 2 weeks ago\par \par Hearing Test performed to evaluate the extent of hearing loss and asymmetry to explain pt's symptoms\par  [de-identified] : MRI-wnl

## 2020-06-01 NOTE — PHYSICAL EXAM
[] : septum deviated to the left [Midline] : trachea located in midline position [Normal] : horizontal positional vertigo maneuver was normal [de-identified] : b.l crepitus  [Nystagmus] : ~T no ~M nystagmus was seen [Fukuda Step Test] : Fukuda Step Test was Negative [Fistula Sign] : Fistula Sign: Negative [Bibi-Hallandréske] : [place]-Hallpike: Negative [de-identified] : wnl

## 2020-06-01 NOTE — HISTORY OF PRESENT ILLNESS
[No] : patient does not have a  history of radiation therapy [Tinnitus] : tinnitus [Vertigo] : vertigo [Hearing Loss] : hearing loss [Septal Deviation] : septal deviation [de-identified] : 64 yo male\par Patient with hx of hearing loss, tinnitus and intermittent vertigo which has now resolved w/amytriptoline presents for follow up.  No change in hearing since last visit. Continues to have bilateral tinnitus, taking Lipoflavonoid daily with no relief.\par Pt has no ear pain, ear drainage, nasal congestion, nasal discharge, epistaxis, sinus infections, facial pain, facial pressure, throat pain, dysphagia or fevers\par \par  [Headache] : no headache [Anxiety] : no anxiety [Otalgia] : no otalgia [Orthostatic Hypotension] : no orthostatic hypotension [Neurologic Symptoms] : no associated neurologic symptoms [Recurrent Otitis Media] : no recurrent otitis media [Otitis Media with Effusion] : no otitis media with effusion [Meniere Disease] : no Meniere disease [Eustachian Tube Dysfunction] : no eustachian tube dysfunction [Cholesteatoma] : no cholesteatoma [Otosclerosis] : no otosclerosis [Perilymphatic Fistula] : no perilymphatic fistula [Autoimmune Diseases] : no autoimmune diseases [Hypertension] : no hypertension [Hypotension] : no hypotension [Loud Noise Exposure] : no history of loud noise exposure [Smoking] : no smoking [Early Onset Hearing Loss] : no early onset hearing loss [Stroke] : no stroke [Facial Pressure] : no facial pressure [Facial Pain] : no facial pain [Nasal Congestion] : no nasal congestion [Diplopia] : no diplopia [Ear Fullness] : no ear fullness [Allergic Rhinitis] : no allergic rhinitis [Environmental Allergies] : no environmental allergies [Seasonal Allergies] : no seasonal allergies [Environmental Allergens] : no environmental allergens [Adenoidectomy] : no adenoidectomy [Allergies] : no allergies [Asthma] : no asthma [Neck Mass] : no neck mass [Neck Pain] : no neck pain [Chills] : no chills [Cold Intolerance] : no cold intolerance [Cough] : no cough [Fatigue] : no fatigue [Heat Intolerance] : no heat intolerance [Hyperthyroidism] : no hyperthyroidism [Sialadenitis] : no sialadenitis [Hodgkin Disease] : no hodgkin disease [Non-Hodgkin Lymphoma] : no non-hodgkin lymphoma [Alcohol Use] : no alcohol use [Tobacco Use] : no tobacco use [Graves Disease] : no graves disease [Thyroid Cancer] : no thyroid cancer

## 2020-06-11 ENCOUNTER — APPOINTMENT (OUTPATIENT)
Dept: OTOLARYNGOLOGY | Facility: CLINIC | Age: 65
End: 2020-06-11
Payer: COMMERCIAL

## 2020-06-11 PROCEDURE — 92557 COMPREHENSIVE HEARING TEST: CPT

## 2020-06-11 PROCEDURE — 92567 TYMPANOMETRY: CPT

## 2020-06-11 PROCEDURE — 99214 OFFICE O/P EST MOD 30 MIN: CPT | Mod: 25

## 2020-06-11 PROCEDURE — 92584 ELECTROCOCHLEOGRAPHY: CPT

## 2020-06-11 RX ORDER — PREDNISONE 10 MG/1
10 TABLET ORAL
Qty: 60 | Refills: 0 | Status: COMPLETED | COMMUNITY
Start: 2019-12-23 | End: 2020-06-11

## 2020-06-11 RX ORDER — METHYLPREDNISOLONE 4 MG/1
4 TABLET ORAL
Qty: 1 | Refills: 0 | Status: DISCONTINUED | COMMUNITY
Start: 2019-12-16 | End: 2020-06-11

## 2020-06-23 NOTE — HISTORY OF PRESENT ILLNESS
[de-identified] : 65M, sees Dr. Stewart- here for evaluation for tinnitus and hearing loss- in 1/2020 left hearing loss -  had pressure and buzzing in the left ear with some dizziness- saw Dr. Stewart- placed on Medrol dose pack- then 2 weeks post was placed on 10mg X 1 week- had audiogram which showed HL on the left ear- pt reports pressure in the right ear- concerned what occurred in left ear can occur- MRI Brain- was normal. Tried Dyazide without success (1 week only - cause too frequent urination)- constantly peeing- stopped after 3 days. Hx of migraines - on amytriptaline since Jan - helped migrianes.. Also sees Dr Larry - + allergies - no formal testing. reduced salt & caffeine - no fam hx of AI vertigo or HL .  Vertigo aty onset not now. \par \par

## 2020-07-09 ENCOUNTER — APPOINTMENT (OUTPATIENT)
Dept: NEUROLOGY | Facility: CLINIC | Age: 65
End: 2020-07-09

## 2020-07-09 ENCOUNTER — APPOINTMENT (OUTPATIENT)
Dept: OTOLARYNGOLOGY | Facility: CLINIC | Age: 65
End: 2020-07-09
Payer: COMMERCIAL

## 2020-07-09 PROCEDURE — 92557 COMPREHENSIVE HEARING TEST: CPT

## 2020-07-09 PROCEDURE — 92567 TYMPANOMETRY: CPT

## 2020-07-22 ENCOUNTER — RX RENEWAL (OUTPATIENT)
Age: 65
End: 2020-07-22

## 2020-07-31 NOTE — HISTORY OF PRESENT ILLNESS
[de-identified] : Patient with asymmetric SNHL & endolymphatic hydrops - s/p 1 month of diuretics - no perceived change
ABDOMINAL PAIN

## 2020-08-19 ENCOUNTER — APPOINTMENT (OUTPATIENT)
Dept: OTOLARYNGOLOGY | Facility: CLINIC | Age: 65
End: 2020-08-19
Payer: COMMERCIAL

## 2020-08-19 PROCEDURE — 92557 COMPREHENSIVE HEARING TEST: CPT

## 2020-08-19 PROCEDURE — 99214 OFFICE O/P EST MOD 30 MIN: CPT | Mod: 25

## 2020-08-19 PROCEDURE — 92567 TYMPANOMETRY: CPT

## 2020-08-19 PROCEDURE — 92625 TINNITUS ASSESSMENT: CPT

## 2020-08-21 NOTE — HISTORY OF PRESENT ILLNESS
[de-identified] : 65M here for f/u for asymmetric SNHL & endolymphatic hydrops - continues with Dyazide-

## 2020-08-25 LAB
CRP SERPL-MCNC: 0.11 MG/DL
ERYTHROCYTE [SEDIMENTATION RATE] IN BLOOD BY WESTERGREN METHOD: 22 MM/HR

## 2020-08-30 ENCOUNTER — EMERGENCY (EMERGENCY)
Facility: HOSPITAL | Age: 65
LOS: 1 days | Discharge: HOME | End: 2020-08-30
Attending: EMERGENCY MEDICINE
Payer: COMMERCIAL

## 2020-08-30 VITALS
WEIGHT: 179.9 LBS | RESPIRATION RATE: 19 BRPM | DIASTOLIC BLOOD PRESSURE: 86 MMHG | HEART RATE: 83 BPM | OXYGEN SATURATION: 96 % | HEIGHT: 70 IN | SYSTOLIC BLOOD PRESSURE: 143 MMHG | TEMPERATURE: 98 F

## 2020-08-30 PROCEDURE — 93010 ELECTROCARDIOGRAM REPORT: CPT

## 2020-08-30 PROCEDURE — 99284 EMERGENCY DEPT VISIT MOD MDM: CPT

## 2020-08-30 NOTE — ED ADULT TRIAGE NOTE - CHIEF COMPLAINT QUOTE
Pt accidentally took 4 pills of triamterene was told by poison control to come in for eval Pt accidentally took 4 pills of triamterene 1 hour ago; was told by poison control to come in for eval.

## 2020-08-31 VITALS
HEART RATE: 60 BPM | DIASTOLIC BLOOD PRESSURE: 82 MMHG | SYSTOLIC BLOOD PRESSURE: 135 MMHG | OXYGEN SATURATION: 99 % | TEMPERATURE: 98 F | RESPIRATION RATE: 16 BRPM

## 2020-08-31 LAB
ALBUMIN SERPL ELPH-MCNC: 5 G/DL — SIGNIFICANT CHANGE UP (ref 3.3–5)
ALP SERPL-CCNC: 70 U/L — SIGNIFICANT CHANGE UP (ref 40–120)
ALT FLD-CCNC: 35 U/L — SIGNIFICANT CHANGE UP (ref 10–45)
ANION GAP SERPL CALC-SCNC: 13 MMOL/L — SIGNIFICANT CHANGE UP (ref 5–17)
AST SERPL-CCNC: 21 U/L — SIGNIFICANT CHANGE UP (ref 10–40)
BILIRUB SERPL-MCNC: 0.4 MG/DL — SIGNIFICANT CHANGE UP (ref 0.2–1.2)
BUN SERPL-MCNC: 32 MG/DL — HIGH (ref 7–23)
CALCIUM SERPL-MCNC: 9.9 MG/DL — SIGNIFICANT CHANGE UP (ref 8.4–10.5)
CHLORIDE SERPL-SCNC: 95 MMOL/L — LOW (ref 96–108)
CO2 SERPL-SCNC: 28 MMOL/L — SIGNIFICANT CHANGE UP (ref 22–31)
CREAT SERPL-MCNC: 1.37 MG/DL — HIGH (ref 0.5–1.3)
GLUCOSE SERPL-MCNC: 112 MG/DL — HIGH (ref 70–99)
HCT VFR BLD CALC: 48.2 % — SIGNIFICANT CHANGE UP (ref 39–50)
HGB BLD-MCNC: 15.7 G/DL — SIGNIFICANT CHANGE UP (ref 13–17)
MCHC RBC-ENTMCNC: 29.3 PG — SIGNIFICANT CHANGE UP (ref 27–34)
MCHC RBC-ENTMCNC: 32.6 GM/DL — SIGNIFICANT CHANGE UP (ref 32–36)
MCV RBC AUTO: 89.9 FL — SIGNIFICANT CHANGE UP (ref 80–100)
NRBC # BLD: 0 /100 WBCS — SIGNIFICANT CHANGE UP (ref 0–0)
PLATELET # BLD AUTO: 293 K/UL — SIGNIFICANT CHANGE UP (ref 150–400)
POTASSIUM SERPL-MCNC: 3.4 MMOL/L — LOW (ref 3.5–5.3)
POTASSIUM SERPL-SCNC: 3.4 MMOL/L — LOW (ref 3.5–5.3)
PROT SERPL-MCNC: 7.5 G/DL — SIGNIFICANT CHANGE UP (ref 6–8.3)
RBC # BLD: 5.36 M/UL — SIGNIFICANT CHANGE UP (ref 4.2–5.8)
RBC # FLD: 12.8 % — SIGNIFICANT CHANGE UP (ref 10.3–14.5)
SODIUM SERPL-SCNC: 136 MMOL/L — SIGNIFICANT CHANGE UP (ref 135–145)
WBC # BLD: 13.66 K/UL — HIGH (ref 3.8–10.5)
WBC # FLD AUTO: 13.66 K/UL — HIGH (ref 3.8–10.5)

## 2020-08-31 PROCEDURE — 85027 COMPLETE CBC AUTOMATED: CPT

## 2020-08-31 PROCEDURE — 80053 COMPREHEN METABOLIC PANEL: CPT

## 2020-08-31 PROCEDURE — 93005 ELECTROCARDIOGRAM TRACING: CPT

## 2020-08-31 PROCEDURE — 99284 EMERGENCY DEPT VISIT MOD MDM: CPT

## 2020-08-31 RX ORDER — POTASSIUM CHLORIDE 20 MEQ
40 PACKET (EA) ORAL ONCE
Refills: 0 | Status: COMPLETED | OUTPATIENT
Start: 2020-08-31 | End: 2020-08-31

## 2020-08-31 RX ORDER — SODIUM CHLORIDE 9 MG/ML
1000 INJECTION INTRAMUSCULAR; INTRAVENOUS; SUBCUTANEOUS ONCE
Refills: 0 | Status: COMPLETED | OUTPATIENT
Start: 2020-08-31 | End: 2020-08-31

## 2020-08-31 RX ADMIN — SODIUM CHLORIDE 1000 MILLILITER(S): 9 INJECTION INTRAMUSCULAR; INTRAVENOUS; SUBCUTANEOUS at 05:04

## 2020-08-31 RX ADMIN — Medication 40 MILLIEQUIVALENT(S): at 03:30

## 2020-08-31 RX ADMIN — SODIUM CHLORIDE 1000 MILLILITER(S): 9 INJECTION INTRAMUSCULAR; INTRAVENOUS; SUBCUTANEOUS at 03:52

## 2020-08-31 NOTE — ED PROVIDER NOTE - ATTENDING CONTRIBUTION TO CARE
MD Paulson:  patient seen and evaluated personally.   I agree with the History & Physical,  Impression & Plan other than what was detailed in my note.  MD Paulson    64 y/o m hx of hld, taking triameterene presents to the ed as he accidentally took four. he thought it was his prescription for prednisone of which he was supposed to take 4 tablets but took these by mistake. This was several hours pta, he has been urinating mulitple times but currently denying any other symptoms. Denies cp, sob, n/v, lighteadedness, syncope, palp. f/c, ha, bv. afebrile bp stable, non toxic, well appearing. unlikely to develop sig toxicity from exposure as k sparing diuretics generally require large doses to cause severe toxicity. will cbc, cmp to ensure no sig hyperk, ekg. obs in ed for six hours after ingestion to ensure does not develop symptoms. if neg likely dc home

## 2020-08-31 NOTE — ED PROVIDER NOTE - PROGRESS NOTE DETAILS
Joe: patient hypokalemic on labs. EKG wnl. Will give IVF and then d/c. Attending Lorene:  pt actually hypokalemic, would expect hyperk if sig toxicity. pt did have mild elevated cr. discussed needs to fu w/ pcp. remained asymptomatic w/ stable vitals. appropriate for dc

## 2020-08-31 NOTE — ED PROVIDER NOTE - PATIENT PORTAL LINK FT
You can access the FollowMyHealth Patient Portal offered by [facility] by registering at the following website: [email or web address]. By joining [email or web address]’s FollowMyHealth portal, you will also be able to view your health information using other applications (apps) compatible with our system.

## 2020-08-31 NOTE — ED PROVIDER NOTE - NSFOLLOWUPINSTRUCTIONS_ED_ALL_ED_FT
Your diagnosis: low potassium    - you took  4 pills of triamterene and started urinating more frequently, which caused your potassium to drop. We gave you potassium back and gave you IV fluids.     Discharge instructions:    - Please do not take any more triamterene until you can follow up with your PCP.    - Hydrate well with liquids with electrolytes (Pedialyte, Gatorade)     - Be sure to return to the ED if you develop new or worsening symptoms. Specific signs and symptoms to be vigilant of: dizziness, fainting, palpitations, chest pain, frequent urination that does not go away.

## 2020-08-31 NOTE — ED ADULT NURSE NOTE - CHIEF COMPLAINT QUOTE
Pt accidentally took 4 pills of triamterene 1 hour ago; was told by poison control to come in for eval.

## 2020-08-31 NOTE — ED PROVIDER NOTE - NS ED ROS FT
GENERAL: no fever, chills, fatigue, weight loss, night sweats  HEENT: no eye pain, discharge, conjunctivitis, ear pain, hearing loss, rhinorrhea, congestion, throat pain  CARDIAC: no chest pain, palpitations, lightheadedness, syncope  PULM: no dyspnea, wheezing  GI: no abdominal pain, nausea, vomiting, diarrhea, constipation, melena, hematochezia  : + urinary frequency  NEURO: no headache, changes in vision, motor weakness, sensory changes  MSK: no joint pain, joint swelling, myalgias  SKIN: no rashes  HEME: no active bleeding, excessive bruising

## 2020-08-31 NOTE — ED PROVIDER NOTE - OBJECTIVE STATEMENT
65M with hx of HLD, recently diagnosed with     4 pills of triamterene 65M with hx of HLD, recently diagnosed with an inner ear problem for which he was prescribed triamterene presenting in ED after accidentally taking 4 pills of triamterene at 9 PM. Denies SI. Patient accidentally 4 pills because he thought it was his other medication. States he has been urinating more frequently since taking it. Denies other symptoms.

## 2020-08-31 NOTE — ED ADULT NURSE NOTE - OBJECTIVE STATEMENT
has meniere's disease, on prednisone  taper and diuretic, triamterene-hctz; today at 9pm pt accidently took 4 of the diuretic instead of the predisone; called poison control and told to come in for eval; has no symptoms at this time. has Meniere's disease, on prednisone  taper and diuretic, triamterene-hctz; today at 9pm pt accidently took 4 of the diuretic instead of the predisone; called poison control and told to come in for eval; has no symptoms at this time. has Meniere's disease, on prednisone  taper and diuretic, triamterene-hctz; today at 9pm pt accidently took 4 of the diuretic instead of the predisone; called poison control and told to come in for eval; has no symptoms at this time; pt voiding in bathroom.

## 2020-08-31 NOTE — ED PROVIDER NOTE - CLINICAL SUMMARY MEDICAL DECISION MAKING FREE TEXT BOX
65M with hx of HLD, recently diagnosed with an inner ear problem for which he was prescribed triamterene presenting in ED after accidentally taking 4 pills of triamterene at 9 PM. On PE, VS wnl, non-focal exam. Will check lytes, EKG, dispo pending work up.

## 2020-09-08 ENCOUNTER — APPOINTMENT (OUTPATIENT)
Dept: OPHTHALMOLOGY | Facility: CLINIC | Age: 65
End: 2020-09-08

## 2020-09-14 ENCOUNTER — APPOINTMENT (OUTPATIENT)
Dept: OTOLARYNGOLOGY | Facility: CLINIC | Age: 65
End: 2020-09-14
Payer: COMMERCIAL

## 2020-09-14 VITALS — HEART RATE: 68 BPM | SYSTOLIC BLOOD PRESSURE: 122 MMHG | DIASTOLIC BLOOD PRESSURE: 70 MMHG

## 2020-09-14 PROCEDURE — 92557 COMPREHENSIVE HEARING TEST: CPT

## 2020-09-14 PROCEDURE — 99213 OFFICE O/P EST LOW 20 MIN: CPT | Mod: 25

## 2020-09-14 PROCEDURE — 92567 TYMPANOMETRY: CPT

## 2020-09-15 NOTE — DATA REVIEWED
[de-identified] : Right- hearing WNL\par Left- -mild to severe sensorineural hearing loss \par Impedance testing reveals normal Type A tympanograms bilaterally

## 2020-09-15 NOTE — HISTORY OF PRESENT ILLNESS
[de-identified] : 65M here for f/u for asymmetric SNHL & endolymphatic hydrops - last seen with acute decline in hearing- course of Prednisone, NAC, and Dyazide- still with fluctuating hearing- can change day to day- can be back baseline and the next day not so. \par

## 2020-09-17 ENCOUNTER — NON-APPOINTMENT (OUTPATIENT)
Age: 65
End: 2020-09-17

## 2020-09-17 ENCOUNTER — APPOINTMENT (OUTPATIENT)
Dept: OPHTHALMOLOGY | Facility: CLINIC | Age: 65
End: 2020-09-17
Payer: COMMERCIAL

## 2020-09-17 PROCEDURE — 92250 FUNDUS PHOTOGRAPHY W/I&R: CPT

## 2020-09-17 PROCEDURE — 92014 COMPRE OPH EXAM EST PT 1/>: CPT

## 2020-09-17 PROCEDURE — 92083 EXTENDED VISUAL FIELD XM: CPT

## 2020-10-26 ENCOUNTER — APPOINTMENT (OUTPATIENT)
Dept: OTOLARYNGOLOGY | Facility: CLINIC | Age: 65
End: 2020-10-26
Payer: COMMERCIAL

## 2020-10-26 PROCEDURE — 92567 TYMPANOMETRY: CPT

## 2020-10-26 PROCEDURE — 99213 OFFICE O/P EST LOW 20 MIN: CPT | Mod: 25

## 2020-10-26 PROCEDURE — 92557 COMPREHENSIVE HEARING TEST: CPT

## 2020-10-26 PROCEDURE — 99072 ADDL SUPL MATRL&STAF TM PHE: CPT

## 2020-10-30 NOTE — HISTORY OF PRESENT ILLNESS
[de-identified] : 65M here for f/u for asymmetric SNHL & endolymphatic hydrops- continues on Dyazide

## 2020-10-30 NOTE — DATA REVIEWED
[de-identified] : Right ear: Hearing is WNL from 250Hz-8kHz\par Left ear: Moderate rising to mild SNHL from 250Hz-2kHz sloping to a moderately-severe SNHL thereafter\par Normal Type A tymps AU

## 2020-11-03 ENCOUNTER — APPOINTMENT (OUTPATIENT)
Dept: ORTHOPEDIC SURGERY | Facility: CLINIC | Age: 65
End: 2020-11-03
Payer: COMMERCIAL

## 2020-11-03 VITALS — HEIGHT: 70 IN | WEIGHT: 172 LBS | BODY MASS INDEX: 24.62 KG/M2

## 2020-11-03 DIAGNOSIS — G89.29 PAIN IN RIGHT HIP: ICD-10-CM

## 2020-11-03 DIAGNOSIS — M25.551 PAIN IN RIGHT HIP: ICD-10-CM

## 2020-11-03 DIAGNOSIS — M25.859 OTHER SPECIFIED JOINT DISORDERS, UNSPECIFIED HIP: ICD-10-CM

## 2020-11-03 PROCEDURE — 99214 OFFICE O/P EST MOD 30 MIN: CPT

## 2020-11-03 PROCEDURE — 73502 X-RAY EXAM HIP UNI 2-3 VIEWS: CPT | Mod: RT

## 2020-11-03 PROCEDURE — 99072 ADDL SUPL MATRL&STAF TM PHE: CPT

## 2020-12-02 ENCOUNTER — APPOINTMENT (OUTPATIENT)
Dept: OTOLARYNGOLOGY | Facility: CLINIC | Age: 65
End: 2020-12-02

## 2021-03-04 ENCOUNTER — APPOINTMENT (OUTPATIENT)
Dept: OTOLARYNGOLOGY | Facility: CLINIC | Age: 66
End: 2021-03-04
Payer: COMMERCIAL

## 2021-03-04 PROCEDURE — 99072 ADDL SUPL MATRL&STAF TM PHE: CPT

## 2021-03-04 PROCEDURE — 92567 TYMPANOMETRY: CPT

## 2021-03-04 PROCEDURE — 99213 OFFICE O/P EST LOW 20 MIN: CPT | Mod: 25

## 2021-03-04 PROCEDURE — 92557 COMPREHENSIVE HEARING TEST: CPT

## 2021-03-18 ENCOUNTER — NON-APPOINTMENT (OUTPATIENT)
Age: 66
End: 2021-03-18

## 2021-03-18 ENCOUNTER — APPOINTMENT (OUTPATIENT)
Dept: OPHTHALMOLOGY | Facility: CLINIC | Age: 66
End: 2021-03-18
Payer: COMMERCIAL

## 2021-03-18 PROCEDURE — 92012 INTRM OPH EXAM EST PATIENT: CPT

## 2021-03-18 PROCEDURE — 92083 EXTENDED VISUAL FIELD XM: CPT

## 2021-03-18 PROCEDURE — 99072 ADDL SUPL MATRL&STAF TM PHE: CPT

## 2021-03-18 PROCEDURE — 92133 CPTRZD OPH DX IMG PST SGM ON: CPT

## 2021-03-19 NOTE — HISTORY OF PRESENT ILLNESS
[de-identified] : 65M here for f/u for asymmetric SNHL & endolymphatic hydrops- continues on Dyazide  - has been dizzy - started 2nd week feb - mild headaches - prior hx of migraines.  Frequent vertigo spells - \par

## 2021-04-12 ENCOUNTER — APPOINTMENT (OUTPATIENT)
Dept: OTOLARYNGOLOGY | Facility: CLINIC | Age: 66
End: 2021-04-12
Payer: COMMERCIAL

## 2021-04-12 VITALS — SYSTOLIC BLOOD PRESSURE: 127 MMHG | HEART RATE: 69 BPM | DIASTOLIC BLOOD PRESSURE: 85 MMHG

## 2021-04-12 DIAGNOSIS — R42 DIZZINESS AND GIDDINESS: ICD-10-CM

## 2021-04-12 PROCEDURE — 99072 ADDL SUPL MATRL&STAF TM PHE: CPT

## 2021-04-12 PROCEDURE — 92567 TYMPANOMETRY: CPT

## 2021-04-12 PROCEDURE — 92557 COMPREHENSIVE HEARING TEST: CPT

## 2021-04-12 PROCEDURE — 99213 OFFICE O/P EST LOW 20 MIN: CPT | Mod: 25

## 2021-04-15 ENCOUNTER — NON-APPOINTMENT (OUTPATIENT)
Age: 66
End: 2021-04-15

## 2021-04-15 LAB
ANION GAP SERPL CALC-SCNC: 14 MMOL/L
BUN SERPL-MCNC: 18 MG/DL
CALCIUM SERPL-MCNC: 9.4 MG/DL
CHLORIDE SERPL-SCNC: 96 MMOL/L
CO2 SERPL-SCNC: 28 MMOL/L
CREAT SERPL-MCNC: 1.06 MG/DL
CRP SERPL-MCNC: <3 MG/L
GLUCOSE SERPL-MCNC: 68 MG/DL
POTASSIUM SERPL-SCNC: 4.8 MMOL/L
SODIUM SERPL-SCNC: 138 MMOL/L

## 2021-04-24 PROBLEM — R42 DIZZY: Status: ACTIVE | Noted: 2019-12-16

## 2021-04-24 NOTE — DATA REVIEWED
[de-identified] : Right ear: Hearing is WNL from 250Hz-8kHz\par Left ear: Essentially moderately-severe SNHL from 250Hz-8kHz\par Normal Type A tymps Au

## 2021-04-24 NOTE — HISTORY OF PRESENT ILLNESS
[de-identified] : 66M here for f/u for asymmetric SNHL & endolymphatic hydrops- didn’t take Diamox for vertigo- continued with Dyazide- notes having stressful period in late Feb- was eating poorly but now better- last episode of vertigo was  at the end of Feb. Hearing feels stable.

## 2021-05-17 ENCOUNTER — NON-APPOINTMENT (OUTPATIENT)
Age: 66
End: 2021-05-17

## 2021-05-21 ENCOUNTER — TRANSCRIPTION ENCOUNTER (OUTPATIENT)
Age: 66
End: 2021-05-21

## 2021-06-03 ENCOUNTER — APPOINTMENT (OUTPATIENT)
Dept: OTOLARYNGOLOGY | Facility: CLINIC | Age: 66
End: 2021-06-03
Payer: COMMERCIAL

## 2021-06-03 VITALS — SYSTOLIC BLOOD PRESSURE: 120 MMHG | DIASTOLIC BLOOD PRESSURE: 77 MMHG | HEART RATE: 91 BPM

## 2021-06-03 PROCEDURE — 99213 OFFICE O/P EST LOW 20 MIN: CPT | Mod: 25

## 2021-06-03 PROCEDURE — 99072 ADDL SUPL MATRL&STAF TM PHE: CPT

## 2021-06-03 PROCEDURE — 92567 TYMPANOMETRY: CPT

## 2021-06-03 PROCEDURE — 92557 COMPREHENSIVE HEARING TEST: CPT

## 2021-06-03 RX ORDER — IBUPROFEN 800 MG/1
800 TABLET, FILM COATED ORAL
Qty: 12 | Refills: 0 | Status: COMPLETED | COMMUNITY
Start: 2021-03-08

## 2021-06-03 RX ORDER — ACETAZOLAMIDE 250 MG/1
250 TABLET ORAL
Qty: 180 | Refills: 2 | Status: COMPLETED | COMMUNITY
Start: 2021-03-04 | End: 2021-06-03

## 2021-06-03 RX ORDER — PREDNISONE 10 MG/1
10 TABLET ORAL
Qty: 70 | Refills: 1 | Status: COMPLETED | COMMUNITY
Start: 2020-06-11 | End: 2021-06-03

## 2021-06-03 RX ORDER — TRIAMTERENE AND HYDROCHLOROTHIAZIDE 25; 37.5 MG/1; MG/1
37.5-25 TABLET ORAL DAILY
Qty: 30 | Refills: 5 | Status: COMPLETED | COMMUNITY
Start: 2020-01-08 | End: 2021-06-03

## 2021-06-03 RX ORDER — ACETYLCYSTEINE 600 MG
600 CAPSULE ORAL DAILY
Qty: 90 | Refills: 6 | Status: COMPLETED | COMMUNITY
Start: 2020-06-11 | End: 2021-06-03

## 2021-06-03 RX ORDER — AMITRIPTYLINE HYDROCHLORIDE 25 MG/1
25 TABLET, FILM COATED ORAL
Qty: 30 | Refills: 5 | Status: COMPLETED | COMMUNITY
Start: 2020-02-10 | End: 2021-06-03

## 2021-06-10 NOTE — DATA REVIEWED
[de-identified] : Right- hearing WNL\par Left- -mild to moderate sensorineural hearing loss\par Impedance testing reveals normal Type A tympanograms bilaterally\par

## 2021-06-10 NOTE — HISTORY OF PRESENT ILLNESS
[de-identified] : 66M f/u for Meniere's Disease and  left SNHL (slight) - continue Dyazide and diet changes - late May had increased vertigo- trail of prednisone 60mg x 7 day taper and betahistine 16mg TID (felt better with vertigo after day 2) - 1 more day of taper left. Pt notes with rain and cloudy changes- head feels foggy - feels like vertigo is coming but doesn't come. Started amitriptyline 10 mg daily back (had stopped several months ago)- was told to taper by neuro- was off/tapered off for 2 months- was taking for left facial numbness .  Audiogram to assess response to therapy

## 2021-08-09 ENCOUNTER — APPOINTMENT (OUTPATIENT)
Dept: OTOLARYNGOLOGY | Facility: CLINIC | Age: 66
End: 2021-08-09

## 2021-08-20 ENCOUNTER — APPOINTMENT (OUTPATIENT)
Dept: ORTHOPEDIC SURGERY | Facility: CLINIC | Age: 66
End: 2021-08-20
Payer: COMMERCIAL

## 2021-08-20 VITALS — HEIGHT: 70 IN | BODY MASS INDEX: 25.05 KG/M2 | WEIGHT: 175 LBS

## 2021-08-20 DIAGNOSIS — Z82.49 FAMILY HISTORY OF ISCHEMIC HEART DISEASE AND OTHER DISEASES OF THE CIRCULATORY SYSTEM: ICD-10-CM

## 2021-08-20 DIAGNOSIS — Z86.69 PERSONAL HISTORY OF OTHER DISEASES OF THE NERVOUS SYSTEM AND SENSE ORGANS: ICD-10-CM

## 2021-08-20 DIAGNOSIS — Z83.511 FAMILY HISTORY OF GLAUCOMA: ICD-10-CM

## 2021-08-20 DIAGNOSIS — M51.36 OTHER INTERVERTEBRAL DISC DEGENERATION, LUMBAR REGION: ICD-10-CM

## 2021-08-20 DIAGNOSIS — Z83.3 FAMILY HISTORY OF DIABETES MELLITUS: ICD-10-CM

## 2021-08-20 PROCEDURE — 72100 X-RAY EXAM L-S SPINE 2/3 VWS: CPT

## 2021-08-20 PROCEDURE — 99204 OFFICE O/P NEW MOD 45 MIN: CPT

## 2021-08-20 PROCEDURE — 99214 OFFICE O/P EST MOD 30 MIN: CPT

## 2021-09-02 ENCOUNTER — APPOINTMENT (OUTPATIENT)
Dept: OTOLARYNGOLOGY | Facility: CLINIC | Age: 66
End: 2021-09-02
Payer: COMMERCIAL

## 2021-09-02 PROCEDURE — 92557 COMPREHENSIVE HEARING TEST: CPT

## 2021-09-02 PROCEDURE — 99214 OFFICE O/P EST MOD 30 MIN: CPT | Mod: 25

## 2021-09-02 PROCEDURE — 92567 TYMPANOMETRY: CPT

## 2021-09-07 NOTE — DATA REVIEWED
[de-identified] : Right ear: Hearing is WNL from 250Hz-8kHz\par Left ear: Moderate SNHL rising to normal hearing from 250Hz-2kHz with a moderate to moderately-severe SNHL thereafter\par Normal Type A tymps Au

## 2021-09-30 ENCOUNTER — APPOINTMENT (OUTPATIENT)
Dept: OPHTHALMOLOGY | Facility: CLINIC | Age: 66
End: 2021-09-30

## 2021-10-13 ENCOUNTER — NON-APPOINTMENT (OUTPATIENT)
Age: 66
End: 2021-10-13

## 2021-10-13 ENCOUNTER — APPOINTMENT (OUTPATIENT)
Dept: OPHTHALMOLOGY | Facility: CLINIC | Age: 66
End: 2021-10-13
Payer: COMMERCIAL

## 2021-10-13 PROCEDURE — 92083 EXTENDED VISUAL FIELD XM: CPT

## 2021-10-13 PROCEDURE — 92014 COMPRE OPH EXAM EST PT 1/>: CPT

## 2021-10-13 PROCEDURE — 92250 FUNDUS PHOTOGRAPHY W/I&R: CPT

## 2021-10-14 ENCOUNTER — APPOINTMENT (OUTPATIENT)
Dept: OTOLARYNGOLOGY | Facility: CLINIC | Age: 66
End: 2021-10-14
Payer: COMMERCIAL

## 2021-10-14 VITALS
SYSTOLIC BLOOD PRESSURE: 114 MMHG | DIASTOLIC BLOOD PRESSURE: 81 MMHG | WEIGHT: 175 LBS | BODY MASS INDEX: 25.05 KG/M2 | HEART RATE: 69 BPM | HEIGHT: 70 IN

## 2021-10-14 PROCEDURE — 92557 COMPREHENSIVE HEARING TEST: CPT

## 2021-10-14 PROCEDURE — 99212 OFFICE O/P EST SF 10 MIN: CPT | Mod: 25

## 2021-10-14 PROCEDURE — 92567 TYMPANOMETRY: CPT

## 2021-10-14 NOTE — HISTORY OF PRESENT ILLNESS
[de-identified] : 66 year old male follow up for Meniere's Disease and SNHL\par Reports completing dose of prednisone and continues to use betahistine. Denies recent episodes of vertigo. Reports slight improvement in hearing. Reports constant left tinnitus persists. Denies otalgia, otorrhea. \par

## 2021-10-14 NOTE — DATA REVIEWED
[de-identified] : Right- hearing WNL\par Left- -mild to moderate sensorineural hearing loss\par Impedance testing reveals normal Type A tympanograms bilaterally\par

## 2021-12-20 ENCOUNTER — APPOINTMENT (OUTPATIENT)
Dept: PULMONOLOGY | Facility: CLINIC | Age: 66
End: 2021-12-20
Payer: COMMERCIAL

## 2021-12-20 ENCOUNTER — NON-APPOINTMENT (OUTPATIENT)
Age: 66
End: 2021-12-20

## 2021-12-20 VITALS
BODY MASS INDEX: 25.92 KG/M2 | SYSTOLIC BLOOD PRESSURE: 110 MMHG | WEIGHT: 175 LBS | HEIGHT: 69 IN | TEMPERATURE: 97.2 F | OXYGEN SATURATION: 98 % | DIASTOLIC BLOOD PRESSURE: 74 MMHG | RESPIRATION RATE: 16 BRPM | HEART RATE: 70 BPM

## 2021-12-20 DIAGNOSIS — M54.9 DORSALGIA, UNSPECIFIED: ICD-10-CM

## 2021-12-20 DIAGNOSIS — Z22.7 LATENT TUBERCULOSIS: ICD-10-CM

## 2021-12-20 DIAGNOSIS — G89.29 DORSALGIA, UNSPECIFIED: ICD-10-CM

## 2021-12-20 PROCEDURE — 71046 X-RAY EXAM CHEST 2 VIEWS: CPT

## 2021-12-20 PROCEDURE — 99204 OFFICE O/P NEW MOD 45 MIN: CPT | Mod: 25

## 2021-12-20 PROCEDURE — 94010 BREATHING CAPACITY TEST: CPT

## 2021-12-20 PROCEDURE — 95012 NITRIC OXIDE EXP GAS DETER: CPT

## 2021-12-20 RX ORDER — PREDNISONE 10 MG/1
10 TABLET ORAL
Qty: 70 | Refills: 1 | Status: DISCONTINUED | COMMUNITY
Start: 2021-09-02 | End: 2021-12-20

## 2021-12-20 RX ORDER — PREDNISONE 10 MG/1
10 TABLET ORAL
Qty: 70 | Refills: 0 | Status: DISCONTINUED | COMMUNITY
Start: 2020-08-19 | End: 2021-12-20

## 2021-12-20 NOTE — HISTORY OF PRESENT ILLNESS
[TextBox_4] : Mr. RIZVI is a 66 year male with a history of facial neuralgia, GERD, rhinitis, H. Pylori, HLD, Meniere's disease positive tuberculin skin test now treated, abnormal CT in the past, NABIL who now comes in for an initial pulmonary evaluation. His chief complaint is\par -he denies SOB\par -He denies SOB on his back or in the middle of the night\par -he notes his bowels are regular \par -His sense of smell and taste are stable \par -his heartburn and reflux are controlled\par -His energy level is 8-9/10\par -He is fully rested when waking up in the morning \par -His memory and concentration are good\par -His neck size is 15.5\par -He denies snoring \par - he reports being able to fall asleep while watching a boring TV show \par -He is sleeping 6.5-7 hours per night\par -He is now on Diuretic QAM \par -He notes intermittent muscle cramps/spasms since starting diuretic\par -his Meniere's disease is now controlled \par -His NABIL is now controlled with weight loss from 190 to 175lbs\par -His GERD improved with weight loss\par -He was recommended to follow up chest XRay for latent TB \par -He notes lumbar pain/issues. He is stretching his back/abdomen frequently for his back pain \par -no new medications, vitamins, or supplements \par -s/p covid 19 vaccine x3\par -s/p flu shot 2021 \par \par - patient denies any headaches, nausea, vomiting, fever, chills, sweats, chest pain, chest pressure, palpitations, coughing, wheezing, fatigue, diarrhea, constipation, dysphagia, myalgias, dizziness, leg swelling, leg pain, itchy eyes, itchy ears,  or sour taste in the mouth

## 2021-12-20 NOTE — ASSESSMENT
[FreeTextEntry1] : Mr. RIZVI is a 66 year male with a history of facial neuralgia, GERD, rhinitis, H. Pylori, HLD, Menieres disease positive tuberculin skin test now treated, abnormal CT in the past, latent TB s/p INH therapy 1990, NABIL who now comes in for an initial pulmonary evaluation for latent TB, abnormal CXR, remote NBAIL, GERD, \par \par Problem 1: Latent TB treated in 1990\par -no need for further testing at this time \par -As for the latent TB infection, 5-13% will go on to develop active disease. No gold standard test for diagnosis. The tuberculin skin test limited sensitivity and specificity, as there is false positives in people who have received BCG; false negatives in people with impaired T-Cell infection. Interferon gamma- release assays, more specific; similar sensitivity to TB skin test (not influenced by BCG). \par \par Problem 2: remote NABIL\par -continue to exercise / observation. recommended to maintain current weight\par -Sleep apnea is associated with adverse clinical consequences which can affect most organ systems. Cardiovascular disease risk includes arrhythmias, atrial fibrillation, hypertension, coronary artery disease, and stroke. Metabolic disorders include diabetes type 2, non-alcoholic fatty liver disease. Mood disorder especially depression; and cognitive decline especially in the elderly. Associations with chronic reflux/Peguero’s esophagus some but not all inclusive. \par -Reasons include arousal consistent with hypopnea; respiratory events most prominent in REM sleep or supine position; therefore sleep staging and body position are important for accurate diagnosis and estimation of AHI. \par  \par Problem 3: GERD\par -No Rx at this time \par -Rule of 2s: avoid eating too much, eating too late, eating too spicy, eating two hours before bed.\par - Things to avoid including overeating, spicy foods, tight clothing, eating within two hours of bed, this list is not all inclusive.\par - For treatments of reflux, possible options discussed including diet control, H2 blockers, PPIs, as well as coating motility agents discussed as treatment options. Timing of meals and proximity of last meal to sleep were discussed. If symptoms persist, a formal gastrointestinal evaluation is needed. \par \par Problem 4: back pain\par -Recommended podiatrist evaluation and check leg lengths \par -Recommended "The Runner's Edge" to evaluate gait \par -Stretches demonstrated to pt in office \par \par \par Problem 5: Health Maintenance\par -s/p flu shot 2021 \par -recommended strep pneumonia vaccines: Prevnar-13 vaccine (completed), follow by Pneumo vaccine 23 one year following (pending)\par -recommended early intervention for URIs\par -recommended regular osteoporosis evaluations\par -recommended early dermatological evaluations\par -recommended after the age of 50 to the age of 70, colonoscopy every 5 years\par \par f/u in 6-8 weeks\par pt is encouraged to call or fax the office with any questions or concerns.\par

## 2021-12-20 NOTE — ADDENDUM
[FreeTextEntry1] : Documented by Lasha Manjarrez acting as a scribe for Dr. Koko Calvo on (12/20/2021).\par \par All medical record entries made by the Scribe were at my, Dr. Koko Calvo's, direction and personally dictated by me on (12/20/2021). I have reviewed the chart and agree that the record accurately reflects my personal performance of the history, physical exam, assessment and plan. I have also personally directed, reviewed, and agree with the discharge instructions.\par

## 2022-01-18 ENCOUNTER — APPOINTMENT (OUTPATIENT)
Dept: DISASTER EMERGENCY | Facility: CLINIC | Age: 67
End: 2022-01-18

## 2022-01-21 ENCOUNTER — APPOINTMENT (OUTPATIENT)
Dept: PULMONOLOGY | Facility: CLINIC | Age: 67
End: 2022-01-21

## 2022-01-30 ENCOUNTER — RX RENEWAL (OUTPATIENT)
Age: 67
End: 2022-01-30

## 2022-02-10 ENCOUNTER — APPOINTMENT (OUTPATIENT)
Dept: OTOLARYNGOLOGY | Facility: CLINIC | Age: 67
End: 2022-02-10
Payer: COMMERCIAL

## 2022-02-10 VITALS
HEIGHT: 70 IN | WEIGHT: 175 LBS | BODY MASS INDEX: 25.05 KG/M2 | SYSTOLIC BLOOD PRESSURE: 122 MMHG | DIASTOLIC BLOOD PRESSURE: 82 MMHG | HEART RATE: 80 BPM

## 2022-02-10 PROCEDURE — 99214 OFFICE O/P EST MOD 30 MIN: CPT | Mod: 25

## 2022-02-10 PROCEDURE — 92567 TYMPANOMETRY: CPT

## 2022-02-10 PROCEDURE — 92557 COMPREHENSIVE HEARING TEST: CPT

## 2022-02-10 RX ORDER — ACETYLCYSTEINE 600 MG
600 CAPSULE ORAL
Qty: 30 | Refills: 0 | Status: DISCONTINUED | COMMUNITY
Start: 2020-07-09 | End: 2022-02-10

## 2022-02-18 ENCOUNTER — APPOINTMENT (OUTPATIENT)
Dept: GASTROENTEROLOGY | Facility: CLINIC | Age: 67
End: 2022-02-18
Payer: COMMERCIAL

## 2022-02-18 VITALS
RESPIRATION RATE: 18 BRPM | SYSTOLIC BLOOD PRESSURE: 125 MMHG | OXYGEN SATURATION: 98 % | WEIGHT: 175 LBS | TEMPERATURE: 98 F | HEART RATE: 85 BPM | BODY MASS INDEX: 24.77 KG/M2 | HEIGHT: 70.5 IN | DIASTOLIC BLOOD PRESSURE: 80 MMHG

## 2022-02-18 DIAGNOSIS — Z86.010 PERSONAL HISTORY OF COLONIC POLYPS: ICD-10-CM

## 2022-02-18 DIAGNOSIS — R12 HEARTBURN: ICD-10-CM

## 2022-02-18 PROCEDURE — 99205 OFFICE O/P NEW HI 60 MIN: CPT

## 2022-02-18 RX ORDER — SODIUM SULFATE, POTASSIUM SULFATE, MAGNESIUM SULFATE 17.5; 3.13; 1.6 G/ML; G/ML; G/ML
17.5-3.13-1.6 SOLUTION, CONCENTRATE ORAL
Qty: 1 | Refills: 0 | Status: ACTIVE | COMMUNITY
Start: 2022-02-18 | End: 1900-01-01

## 2022-02-19 NOTE — DATA REVIEWED
[de-identified] : Right- hearing WNL\par Left- -mild to moderate essentially sensorineural hearing loss\par Impedance testing reveals normal Type A tympanograms bilaterally\par

## 2022-02-19 NOTE — HISTORY OF PRESENT ILLNESS
[de-identified] : 65 yo M follow up for Meniere's Disease and SNHL. Continues to use betahistine. Denies recent episodes of vertigo. Scaled back on amitriptyline and then started having vertigo. Went to taking medication every other day with resolution of vertigo. Hearing stable. Constant left tinnitus and occasionally turns into a loud buzz for a few seconds. For the past 6 months wakes up with mild headache resolves without intervention without any intervention. Denies otalgia, otorrhea.

## 2022-03-10 ENCOUNTER — APPOINTMENT (OUTPATIENT)
Dept: OTOLARYNGOLOGY | Facility: CLINIC | Age: 67
End: 2022-03-10
Payer: COMMERCIAL

## 2022-03-10 PROCEDURE — 99213 OFFICE O/P EST LOW 20 MIN: CPT

## 2022-03-10 PROCEDURE — 92567 TYMPANOMETRY: CPT

## 2022-03-10 PROCEDURE — 92557 COMPREHENSIVE HEARING TEST: CPT

## 2022-03-15 ENCOUNTER — APPOINTMENT (OUTPATIENT)
Dept: NEUROLOGY | Facility: CLINIC | Age: 67
End: 2022-03-15
Payer: COMMERCIAL

## 2022-03-15 VITALS
HEIGHT: 70 IN | SYSTOLIC BLOOD PRESSURE: 125 MMHG | WEIGHT: 175 LBS | BODY MASS INDEX: 25.05 KG/M2 | HEART RATE: 71 BPM | DIASTOLIC BLOOD PRESSURE: 80 MMHG

## 2022-03-15 DIAGNOSIS — N52.9 MALE ERECTILE DYSFUNCTION, UNSPECIFIED: ICD-10-CM

## 2022-03-15 DIAGNOSIS — R51.9 HEADACHE, UNSPECIFIED: ICD-10-CM

## 2022-03-15 PROCEDURE — 99214 OFFICE O/P EST MOD 30 MIN: CPT

## 2022-03-15 NOTE — CONSULT LETTER
[Dear  ___] : Dear  [unfilled], [Consult Letter:] : I had the pleasure of evaluating your patient, [unfilled]. [Please see my note below.] : Please see my note below. [Consult Closing:] : Thank you very much for allowing me to participate in the care of this patient.  If you have any questions, please do not hesitate to contact me. [Sincerely,] : Sincerely, [FreeTextEntry3] : Signature\par  [DrAshwin  ___] : Dr. AGUIRRE [DrAshwin ___] : Dr. AGUIRRE

## 2022-03-15 NOTE — ASSESSMENT
[FreeTextEntry1] : Mr. Evasn is a 67-year-old with Ménière's disease, episodic headaches, fluctuating facial and limb sensory symptoms and more recent frontal headaches and erectile dysfunction.  A unifying diagnosis is not obvious.  Mr. Evans believes that amitriptyline is contributing to his Ménière's symptoms and possibly headaches.\par \par For completeness, I will arrange an updated MRI of the brain and pituitary with and without contrast and a serologic evaluation.  I suggested that he taper and discontinue amitriptyline over the next 4 weeks and monitor his symptoms.  Further management will depend upon these results and his clinical course.

## 2022-03-15 NOTE — REVIEW OF SYSTEMS
[Sleep Disturbances] : sleep disturbances [Tension Headache] : tension-type headaches [Negative] : Heme/Lymph

## 2022-03-15 NOTE — HISTORY OF PRESENT ILLNESS
[FreeTextEntry1] : Mr. Shashi Evans is a 67-year-old right-handed patient who was previously under the care of Dr. Ousmane Van.\par \par When first seen in 2017, he was experiencing facial and limb sensory symptoms which were felt to possibly represent migraine.\par \par MRI of the brain revealed mild white matter changes and an MRI of the cervical spine revealed mild cord flattening at C5-6.\par \par In , he was begun on amitriptyline 10 mg daily.  Since beginning the medication, he has developed left ear hearing loss and episodic vertigo.  He was diagnosed with Ménière's disease.  He is being treated with betahistine, HCTZ and triamterene.\par \par His last episode of left jaw numbness occurred about 7 months ago.  He subsequently began tapering his amitriptyline.  With the lower dose, he has been awakening with frontal headaches.  He believes that amitriptyline is causing his Ménière's syndrome.\par \par He has had recent difficulty initiating sleep.  He rarely snores.  He has been experiencing erectile difficulties for 6 months.\par \par Past surgical history is notable for laser procedures for rectal tears.  He suffers of hyperlipidemia, GERD and many years disease.  He has no drug allergies.\par \par Medications include amitriptyline 10 mg/day, atorvastatin 20 mg daily, betahistine, HCTZ, triamterene and baby aspirin 3 times a week.\par \par He is a former smoker.  He drinks socially.  He is  works as an .  Family history is notable for a father and brother  in motor vehicle accidents.  His mother is healthy.\par

## 2022-03-15 NOTE — PHYSICAL EXAM
[FreeTextEntry1] : Constitutional:  Patient was well-developed, well-nourished and in no acute distress. \par \par Head:  Normocephalic, atraumatic. Tympanic membranes were clear. \par \par Neck:  Supple with full range of motion. \par \par Cardiovascular:  Cardiac rhythm was regular without murmur. There were no carotid bruits. Peripheral pulses were full and symmetric. \par \par Respiratory:  Lungs were clear. \par \par Abdomen:  Soft and nontender. \par \par Spine:  Nontender. \par \par Skin:  There were no rashes. \par \par NEUROLOGICAL EXAMINATION:\par \par Mental Status: Patient was alert and oriented. Speech was fluent. There was no dysarthria. \par \par Cranial Nerves: \par \par II: Visual acuity was 20/20-1 in the right eye and 20/25-2 left eye with glasses near card. Pupils were equal and reactive. Visual fields were full. Funduscopic examination was normal. \par \par III, IV, VI:  Eye movements were full with bilateral horizontal and gaze nystagmus.\par \par V: Facial sensation was intact. \par \par VII: Facial strength was normal. \par \par VIII: Hearing was equal. \par \par IX, X: Palatal movement was normal. Phonation was normal. \par \par XI: Sternocleidomastoids and trapezii were normal. \par \par XII: Tongue was midline and movements normal. There was no lingual atrophy or fasciculations. \par \par Motor Examination: Muscle bulk, tone and strength were normal. \par \par Sensory Examination: Pinprick, vibration and joint position sense were intact. \par \par Reflexes: DTRs were 2+ throughout. \par \par Plantar Responses: Plantar responses were flexor. \par \par Coordination/Cerebellar Function: There was no dysmetria on finger to nose or heel to shin testing. \par \par Gait/Stance: Gait was normal. Romberg was negative.  Tandem was mildly unsteady.\par

## 2022-03-18 ENCOUNTER — NON-APPOINTMENT (OUTPATIENT)
Age: 67
End: 2022-03-18

## 2022-03-18 LAB
ALBUMIN SERPL ELPH-MCNC: 4.9 G/DL
ALP BLD-CCNC: 63 U/L
ALT SERPL-CCNC: 37 U/L
ANION GAP SERPL CALC-SCNC: 12 MMOL/L
AST SERPL-CCNC: 22 U/L
BASOPHILS # BLD AUTO: 0.03 K/UL
BASOPHILS NFR BLD AUTO: 0.5 %
BILIRUB SERPL-MCNC: 0.4 MG/DL
BUN SERPL-MCNC: 21 MG/DL
CALCIUM SERPL-MCNC: 9.4 MG/DL
CHLORIDE SERPL-SCNC: 100 MMOL/L
CO2 SERPL-SCNC: 28 MMOL/L
CORTIS SERPL-MCNC: 9.9 UG/DL
CREAT SERPL-MCNC: 1.39 MG/DL
CRP SERPL-MCNC: <3 MG/L
EGFR: 56 ML/MIN/1.73M2
EOSINOPHIL # BLD AUTO: 0.06 K/UL
EOSINOPHIL NFR BLD AUTO: 1.1 %
FSH SERPL-MCNC: 7.2 IU/L
GLUCOSE SERPL-MCNC: 92 MG/DL
HCT VFR BLD CALC: 46.1 %
HGB BLD-MCNC: 14.8 G/DL
IMM GRANULOCYTES NFR BLD AUTO: 0.4 %
LH SERPL-ACNC: 5.9 IU/L
LYMPHOCYTES # BLD AUTO: 1.01 K/UL
LYMPHOCYTES NFR BLD AUTO: 18.1 %
MAN DIFF?: NORMAL
MCHC RBC-ENTMCNC: 28.6 PG
MCHC RBC-ENTMCNC: 32.1 GM/DL
MCV RBC AUTO: 89.2 FL
MONOCYTES # BLD AUTO: 0.41 K/UL
MONOCYTES NFR BLD AUTO: 7.3 %
NEUTROPHILS # BLD AUTO: 4.05 K/UL
NEUTROPHILS NFR BLD AUTO: 72.6 %
PLATELET # BLD AUTO: 260 K/UL
POTASSIUM SERPL-SCNC: 4.3 MMOL/L
PROLACTIN SERPL-MCNC: 12.1 NG/ML
PROT SERPL-MCNC: 6.9 G/DL
RBC # BLD: 5.17 M/UL
RBC # FLD: 12.7 %
SODIUM SERPL-SCNC: 140 MMOL/L
T3FREE SERPL-MCNC: 3.15 PG/ML
T4 FREE SERPL-MCNC: 1.2 NG/DL
TESTOST SERPL-MCNC: 576 NG/DL
TSH SERPL-ACNC: 1.21 UIU/ML
WBC # FLD AUTO: 5.58 K/UL

## 2022-03-19 LAB — ERYTHROCYTE [SEDIMENTATION RATE] IN BLOOD BY WESTERGREN METHOD: 12 MM/HR

## 2022-03-21 LAB — IGF BP1 SERPL-MCNC: 158 NG/ML

## 2022-03-22 LAB — INNER EAR 68KD AB FLD QL: NEGATIVE

## 2022-03-24 LAB
ASIALO-GM1 ANTIBODIES, IGG/IGM: 23 IV
GD1A ANTIBODIES, IGG/IGM: 5 IV
GD1B ANTIBODIES, IGG/IGM: 16 IV
GM1 ANTIBODIES, IGG/IGM: 18 IV
GM2 ANTIBODIES, IGG/IGM: 6 IV
GQ1B ANTIBODIES, IGG/IGM: 5 IV

## 2022-03-26 LAB
AMPA-R ABCBA: NEGATIVE
AMPHIPHYSIN IGG TITR SER IF: NEGATIVE TITER
CASPR2-IGG CBA, S: NEGATIVE
CV2 IGG TITR SER: NEGATIVE TITER
GABA-B ABCBA: NEGATIVE
GAD65 AB SER-MCNC: 0 NMOL/L
GLIAL NUC TYPE 1 AB TITR SER: NEGATIVE TITER
HU1 AB TITR SER: NEGATIVE TITER
HU2 AB TITR SER IF: NEGATIVE TITER
HU3 AB TITR SER: NEGATIVE TITER
IGLON5 IFA, S: NEGATIVE
IMMUNOLOGIST REVIEW: NORMAL
LGI1-IGG CBA, S: NEGATIVE
NIF IFA, S: NEGATIVE
NMDA-R ABCBA: NEGATIVE
PCA-1 AB TITR SER: NEGATIVE TITER
PCA-2 AB TITR SER: NEGATIVE TITER
PCA-TR AB TITR SER: NEGATIVE TITER
REFLEX ADDED: NORMAL

## 2022-03-31 ENCOUNTER — APPOINTMENT (OUTPATIENT)
Dept: MRI IMAGING | Facility: CLINIC | Age: 67
End: 2022-03-31
Payer: COMMERCIAL

## 2022-03-31 ENCOUNTER — OUTPATIENT (OUTPATIENT)
Dept: OUTPATIENT SERVICES | Facility: HOSPITAL | Age: 67
LOS: 1 days | End: 2022-03-31
Payer: COMMERCIAL

## 2022-03-31 DIAGNOSIS — Z00.8 ENCOUNTER FOR OTHER GENERAL EXAMINATION: ICD-10-CM

## 2022-03-31 PROCEDURE — 70553 MRI BRAIN STEM W/O & W/DYE: CPT

## 2022-03-31 PROCEDURE — 70553 MRI BRAIN STEM W/O & W/DYE: CPT | Mod: 26

## 2022-03-31 PROCEDURE — A9585: CPT

## 2022-04-04 ENCOUNTER — TRANSCRIPTION ENCOUNTER (OUTPATIENT)
Age: 67
End: 2022-04-04

## 2022-04-20 NOTE — DATA REVIEWED
[de-identified] : Right ear: Hearing is WNL from 250Hz-8kHz.\par Left ear: Moderate rising to mild SNHL from 250Hz-2kHz with a moderately-severe SNHL thereafter\par Type A tymps Au

## 2022-05-02 ENCOUNTER — APPOINTMENT (OUTPATIENT)
Dept: OTOLARYNGOLOGY | Facility: CLINIC | Age: 67
End: 2022-05-02
Payer: COMMERCIAL

## 2022-05-02 VITALS
WEIGHT: 174 LBS | SYSTOLIC BLOOD PRESSURE: 129 MMHG | DIASTOLIC BLOOD PRESSURE: 82 MMHG | HEIGHT: 70 IN | HEART RATE: 63 BPM | BODY MASS INDEX: 24.91 KG/M2

## 2022-05-02 PROCEDURE — 99214 OFFICE O/P EST MOD 30 MIN: CPT | Mod: 25

## 2022-05-02 PROCEDURE — 36415 COLL VENOUS BLD VENIPUNCTURE: CPT

## 2022-05-02 PROCEDURE — 92557 COMPREHENSIVE HEARING TEST: CPT

## 2022-05-02 PROCEDURE — 92567 TYMPANOMETRY: CPT

## 2022-05-02 RX ORDER — PREDNISONE 10 MG/1
10 TABLET ORAL
Qty: 70 | Refills: 0 | Status: DISCONTINUED | COMMUNITY
Start: 2022-02-10 | End: 2022-05-02

## 2022-05-02 RX ORDER — HYDROCORTISONE 25 MG/G
2.5 CREAM TOPICAL
Qty: 28 | Refills: 0 | Status: DISCONTINUED | COMMUNITY
Start: 2022-03-14

## 2022-05-02 RX ORDER — OMEPRAZOLE 40 MG/1
40 CAPSULE, DELAYED RELEASE ORAL
Qty: 30 | Refills: 1 | Status: DISCONTINUED | COMMUNITY
Start: 2022-02-10 | End: 2022-05-02

## 2022-05-03 LAB
CRP SERPL-MCNC: <3 MG/L
ERYTHROCYTE [SEDIMENTATION RATE] IN BLOOD BY WESTERGREN METHOD: 15 MM/HR

## 2022-05-23 ENCOUNTER — NON-APPOINTMENT (OUTPATIENT)
Age: 67
End: 2022-05-23

## 2022-05-23 ENCOUNTER — APPOINTMENT (OUTPATIENT)
Dept: OPHTHALMOLOGY | Facility: CLINIC | Age: 67
End: 2022-05-23

## 2022-05-23 ENCOUNTER — APPOINTMENT (OUTPATIENT)
Dept: OPHTHALMOLOGY | Facility: CLINIC | Age: 67
End: 2022-05-23
Payer: COMMERCIAL

## 2022-05-23 PROCEDURE — 92083 EXTENDED VISUAL FIELD XM: CPT

## 2022-05-23 PROCEDURE — 92133 CPTRZD OPH DX IMG PST SGM ON: CPT

## 2022-05-23 PROCEDURE — 92012 INTRM OPH EXAM EST PATIENT: CPT

## 2022-05-30 NOTE — HISTORY OF PRESENT ILLNESS
[de-identified] : 65 yo M follow up for Meniere's Disease and SNHL. Continues to use betahistine and Dyazide. Saw Neuro - Dr. Bloom - had MRI of brain - normal and labs - elevated Cr. Started to wean amitriptyline.

## 2022-05-30 NOTE — DATA REVIEWED
[de-identified] : Hearing WNL, AD\par Moderate to moderate-severe SNHL through 8kHz with a mild loss at 6kHz, AS\par Type A tymp, AU

## 2022-06-20 ENCOUNTER — APPOINTMENT (OUTPATIENT)
Dept: OTOLARYNGOLOGY | Facility: CLINIC | Age: 67
End: 2022-06-20
Payer: COMMERCIAL

## 2022-06-20 VITALS — HEIGHT: 70 IN | WEIGHT: 174 LBS | BODY MASS INDEX: 24.91 KG/M2

## 2022-06-20 PROCEDURE — 92557 COMPREHENSIVE HEARING TEST: CPT

## 2022-06-20 PROCEDURE — 92567 TYMPANOMETRY: CPT

## 2022-06-20 PROCEDURE — 99213 OFFICE O/P EST LOW 20 MIN: CPT

## 2022-06-20 RX ORDER — PREDNISONE 10 MG/1
10 TABLET ORAL
Qty: 70 | Refills: 0 | Status: DISCONTINUED | COMMUNITY
Start: 2022-05-02 | End: 2022-06-20

## 2022-06-29 NOTE — DATA REVIEWED
[de-identified] : Right - hearing WNL\par Left -mild to moderate sensorineural hearing loss\par Impedance testing reveals normal Type A tympanograms bilaterally\par

## 2022-07-21 ENCOUNTER — RX RENEWAL (OUTPATIENT)
Age: 67
End: 2022-07-21

## 2022-07-21 RX ORDER — AMITRIPTYLINE HYDROCHLORIDE 10 MG/1
10 TABLET, FILM COATED ORAL
Qty: 90 | Refills: 3 | Status: ACTIVE | COMMUNITY
Start: 2021-08-10 | End: 1900-01-01

## 2022-08-10 ENCOUNTER — APPOINTMENT (OUTPATIENT)
Dept: GASTROENTEROLOGY | Facility: AMBULATORY MEDICAL SERVICES | Age: 67
End: 2022-08-10

## 2022-08-10 PROCEDURE — 45380 COLONOSCOPY AND BIOPSY: CPT | Mod: 33

## 2022-08-17 ENCOUNTER — NON-APPOINTMENT (OUTPATIENT)
Age: 67
End: 2022-08-17

## 2022-08-19 ENCOUNTER — APPOINTMENT (OUTPATIENT)
Dept: GASTROENTEROLOGY | Facility: CLINIC | Age: 67
End: 2022-08-19

## 2022-08-29 ENCOUNTER — APPOINTMENT (OUTPATIENT)
Dept: PHARMACY | Facility: CLINIC | Age: 67
End: 2022-08-29

## 2022-08-29 PROCEDURE — V5010 ASSESSMENT FOR HEARING AID: CPT | Mod: NC

## 2022-09-09 ENCOUNTER — NON-APPOINTMENT (OUTPATIENT)
Age: 67
End: 2022-09-09

## 2022-09-09 ENCOUNTER — APPOINTMENT (OUTPATIENT)
Dept: OPHTHALMOLOGY | Facility: CLINIC | Age: 67
End: 2022-09-09

## 2022-09-09 PROCEDURE — 92083 EXTENDED VISUAL FIELD XM: CPT

## 2022-09-09 PROCEDURE — 92014 COMPRE OPH EXAM EST PT 1/>: CPT

## 2022-09-12 ENCOUNTER — NON-APPOINTMENT (OUTPATIENT)
Age: 67
End: 2022-09-12

## 2022-09-12 ENCOUNTER — APPOINTMENT (OUTPATIENT)
Dept: PULMONOLOGY | Facility: CLINIC | Age: 67
End: 2022-09-12

## 2022-09-12 VITALS
TEMPERATURE: 97.4 F | BODY MASS INDEX: 24.77 KG/M2 | DIASTOLIC BLOOD PRESSURE: 64 MMHG | OXYGEN SATURATION: 98 % | HEIGHT: 70.5 IN | RESPIRATION RATE: 16 BRPM | SYSTOLIC BLOOD PRESSURE: 116 MMHG | WEIGHT: 175 LBS | HEART RATE: 77 BPM

## 2022-09-12 PROCEDURE — 95012 NITRIC OXIDE EXP GAS DETER: CPT

## 2022-09-12 PROCEDURE — 94010 BREATHING CAPACITY TEST: CPT

## 2022-09-12 PROCEDURE — 99214 OFFICE O/P EST MOD 30 MIN: CPT | Mod: 25

## 2022-09-12 NOTE — ADDENDUM
[FreeTextEntry1] : Documented by Lawson Galvez acting as a scribe for Dr. Koko Calvo on 09/12/2022 .\par \par All medical record entries made by the Scribe were at my, Dr. Koko Calvo's, direction and personally dictated by me on 09/12/2022. I have reviewed the chart and agree that the record accurately reflects my personal performance of the history, physical exam, assessment and plan. I have also personally directed, reviewed, and agree with the discharge instructions.

## 2022-09-12 NOTE — ASSESSMENT
[FreeTextEntry1] : Mr. RIZVI is a 67 year male with a history of facial neuralgia, GERD, rhinitis, H. Pylori, HLD, Menieres disease positive tuberculin skin test now treated, abnormal CT in the past, latent TB s/p INH therapy 1990, NABIL who now comes in for a follow up pulmonary evaluation for latent TB, abnormal CXR, remote NABIL, GERD,- s/p Covid 19 6/2022, 7/2022\par \par Problem 1: Latent TB treated in 1990\par -no need for further testing at this time \par -As for the latent TB infection, 5-13% will go on to develop active disease. No gold standard test for diagnosis. The tuberculin skin test limited sensitivity and specificity, as there is false positives in people who have received BCG; false negatives in people with impaired T-Cell infection. Interferon gamma- release assays, more specific; similar sensitivity to TB skin test (not influenced by BCG). \par \par Problem 2: remote NABIL\par -continue to exercise / observation. recommended to maintain current weight\par -Sleep apnea is associated with adverse clinical consequences which can affect most organ systems. Cardiovascular disease risk includes arrhythmias, atrial fibrillation, hypertension, coronary artery disease, and stroke. Metabolic disorders include diabetes type 2, non-alcoholic fatty liver disease. Mood disorder especially depression; and cognitive decline especially in the elderly. Associations with chronic reflux/Peguero’s esophagus some but not all inclusive. \par -Reasons include arousal consistent with hypopnea; respiratory events most prominent in REM sleep or supine position; therefore sleep staging and body position are important for accurate diagnosis and estimation of AHI. \par  \par Problem 3: GERD\par -No Rx at this time \par -Rule of 2s: avoid eating too much, eating too late, eating too spicy, eating two hours before bed.\par - Things to avoid including overeating, spicy foods, tight clothing, eating within two hours of bed, this list is not all inclusive.\par - For treatments of reflux, possible options discussed including diet control, H2 blockers, PPIs, as well as coating motility agents discussed as treatment options. Timing of meals and proximity of last meal to sleep were discussed. If symptoms persist, a formal gastrointestinal evaluation is needed. \par \par Problem 4: back pain\par -Recommended podiatrist evaluation and check leg lengths \par -Recommended "The Runner's Edge" to evaluate gait \par -Stretches demonstrated to pt in office \par \par Problem 5: Health Maintenance\par -s/p COVID-19 vaccine x4 \par -s/p Covid 19 6/2022, 7/2022\par -s/p flu shot 2021 \par -recommended strep pneumonia vaccines: Prevnar-13 vaccine (completed), follow by Pneumo vaccine 23 one year following (pending)\par -recommended early intervention for URIs\par -recommended regular osteoporosis evaluations\par -recommended early dermatological evaluations\par -recommended after the age of 50 to the age of 70, colonoscopy every 5 years\par \par f/u in 6-8 weeks\par pt is encouraged to call or fax the office with any questions or concerns.\par

## 2022-09-12 NOTE — PROCEDURE
[FreeTextEntry1] : PFT reveals normal flows, with an FEV1 of 3.18 L, which is 93% of predicted, with a normal flow volume loop. \par \par FENO was 21; a normal value being less than 25\par Fractional exhaled nitric oxide (FENO) is regarded as a simple, noninvasive method for assessing eosinophilic airway inflammation. Produced by a variety of cells within the lung, nitric oxide (NO) concentrations are generally low in healthy individuals. However, high concentrations of NO appear to be involved in nonspecific host defense mechanisms and chronic inflammatory diseases such as asthma. The American Thoracic Society (ATS) therefore has recommended using FENO to aid in the diagnosis and monitoring of eosinophilic airway inflammation and asthma, and for identifying steroid responsive individuals whose chronic respiratory symptoms may be caused by airway inflammation.

## 2022-09-12 NOTE — HISTORY OF PRESENT ILLNESS
[TextBox_4] : Mr. RIZVI is a 67 year male with a history of facial neuralgia, GERD, rhinitis, H. 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 His chief complaint is\par \par -he notes generally feeling good \par -he notes intermittent headaches due to Meniere's disease\par -he notes bowels are good and regular \par -he notes dizziness due to Meniere's disease\par -he notes heartburn is now controlled \par -he notes breathing is stable \par -he denies dysphonia \par -he notes undergoing a clinical trial for Meniere's disease\par -s/p Covid 19 in 6/2022 with dizziness, nausea, sweating, pharyngitis and congestion (s/p course of paxlovid), and 7/2022 with mild symptoms\par -he denies SOB\par -he denies nocturnal dyspnea \par -he notes weight is stable \par -he denies snoring \par -s/p COVID-19 vaccine x4 \par -he notes back pain is controlled with stretching \par -he denies sinus issues \par \par -he denies any arthralgias, chest pain, chest pressure, chills, constipation, coughing, diarrhea, dizziness, dysphagia, fever, heartburn, reflux, itchy eyes, itchy ears, leg swelling, leg pain, myalgias, nausea, palpitations, sweats, vomiting, wheezing or sour taste in the mouth.

## 2022-09-26 ENCOUNTER — APPOINTMENT (OUTPATIENT)
Dept: OTOLARYNGOLOGY | Facility: CLINIC | Age: 67
End: 2022-09-26

## 2022-09-29 ENCOUNTER — APPOINTMENT (OUTPATIENT)
Dept: OPHTHALMOLOGY | Facility: CLINIC | Age: 67
End: 2022-09-29

## 2022-10-17 ENCOUNTER — APPOINTMENT (OUTPATIENT)
Dept: PHARMACY | Facility: CLINIC | Age: 67
End: 2022-10-17

## 2022-10-17 PROCEDURE — V5257K: CUSTOM | Mod: LT

## 2022-10-20 ENCOUNTER — APPOINTMENT (OUTPATIENT)
Dept: OPHTHALMOLOGY | Facility: CLINIC | Age: 67
End: 2022-10-20

## 2022-10-20 ENCOUNTER — NON-APPOINTMENT (OUTPATIENT)
Age: 67
End: 2022-10-20

## 2022-10-20 PROCEDURE — 92012 INTRM OPH EXAM EST PATIENT: CPT

## 2022-10-20 PROCEDURE — 92133 CPTRZD OPH DX IMG PST SGM ON: CPT

## 2022-10-26 ENCOUNTER — NON-APPOINTMENT (OUTPATIENT)
Age: 67
End: 2022-10-26

## 2022-10-31 ENCOUNTER — APPOINTMENT (OUTPATIENT)
Dept: PHARMACY | Facility: CLINIC | Age: 67
End: 2022-10-31

## 2022-10-31 PROCEDURE — V5299A: CUSTOM | Mod: NC

## 2022-11-08 ENCOUNTER — APPOINTMENT (OUTPATIENT)
Dept: PHARMACY | Facility: CLINIC | Age: 67
End: 2022-11-08

## 2022-11-08 PROCEDURE — V5299A: CUSTOM | Mod: NC

## 2022-11-21 ENCOUNTER — APPOINTMENT (OUTPATIENT)
Dept: OTOLARYNGOLOGY | Facility: CLINIC | Age: 67
End: 2022-11-21

## 2022-11-21 ENCOUNTER — APPOINTMENT (OUTPATIENT)
Dept: PHARMACY | Facility: CLINIC | Age: 67
End: 2022-11-21

## 2022-11-21 VITALS
WEIGHT: 170 LBS | HEART RATE: 79 BPM | SYSTOLIC BLOOD PRESSURE: 111 MMHG | BODY MASS INDEX: 24.07 KG/M2 | HEIGHT: 70.5 IN | DIASTOLIC BLOOD PRESSURE: 79 MMHG

## 2022-11-21 DIAGNOSIS — H91.22 SUDDEN IDIOPATHIC HEARING LOSS, LEFT EAR: ICD-10-CM

## 2022-11-21 PROCEDURE — 92567 TYMPANOMETRY: CPT

## 2022-11-21 PROCEDURE — 92557 COMPREHENSIVE HEARING TEST: CPT

## 2022-11-21 PROCEDURE — V5299A: CUSTOM | Mod: NC

## 2022-11-21 PROCEDURE — 99214 OFFICE O/P EST MOD 30 MIN: CPT

## 2022-11-28 ENCOUNTER — APPOINTMENT (OUTPATIENT)
Dept: PHARMACY | Facility: CLINIC | Age: 67
End: 2022-11-28

## 2022-11-28 PROCEDURE — V5299A: CUSTOM | Mod: NC

## 2022-12-04 PROBLEM — H91.22 SUDDEN IDIOPATHIC HEARING LOSS OF LEFT EAR WITH UNRESTRICTED HEARING OF RIGHT EAR: Status: ACTIVE | Noted: 2020-08-19

## 2022-12-04 NOTE — HISTORY OF PRESENT ILLNESS
[de-identified] : No vertigo - using Hearing Aid - feels not as helpful as before - no headaches -

## 2023-01-01 NOTE — DATA REVIEWED
[de-identified] : Right- hearing WNL\par Left- moderate sensorineural hearing loss\par Impedance testing reveals normal Type A tympanograms bilaterally\par  0

## 2023-01-04 ENCOUNTER — APPOINTMENT (OUTPATIENT)
Dept: PHARMACY | Facility: CLINIC | Age: 68
End: 2023-01-04
Payer: SELF-PAY

## 2023-01-04 ENCOUNTER — APPOINTMENT (OUTPATIENT)
Dept: OTOLARYNGOLOGY | Facility: CLINIC | Age: 68
End: 2023-01-04
Payer: COMMERCIAL

## 2023-01-04 VITALS
WEIGHT: 175 LBS | HEIGHT: 70.5 IN | BODY MASS INDEX: 24.77 KG/M2 | HEART RATE: 71 BPM | SYSTOLIC BLOOD PRESSURE: 116 MMHG | DIASTOLIC BLOOD PRESSURE: 78 MMHG

## 2023-01-04 PROCEDURE — 92567 TYMPANOMETRY: CPT

## 2023-01-04 PROCEDURE — 92557 COMPREHENSIVE HEARING TEST: CPT

## 2023-01-04 PROCEDURE — 99213 OFFICE O/P EST LOW 20 MIN: CPT

## 2023-01-04 PROCEDURE — V5299A: CUSTOM | Mod: NC

## 2023-01-04 RX ORDER — PREDNISONE 10 MG/1
10 TABLET ORAL
Qty: 52 | Refills: 0 | Status: DISCONTINUED | COMMUNITY
Start: 2022-11-21 | End: 2023-01-04

## 2023-01-04 RX ORDER — TRIAMTERENE AND HYDROCHLOROTHIAZIDE 25; 37.5 MG/1; MG/1
37.5-25 TABLET ORAL DAILY
Qty: 90 | Refills: 3 | Status: DISCONTINUED | COMMUNITY
Start: 2020-06-11 | End: 2023-01-04

## 2023-01-05 NOTE — HISTORY OF PRESENT ILLNESS
[de-identified] : 67 year old man, 2 month follow up for Left Menieres disease - sudden hearing loss of Left (unilateral.)  Wears Left hearing aid.  Started on NAC and Prednisone taper - taken with no relief - has not noticed a change in symptoms since last visit. Reports continues to have Left tinnitus - no change.  Denies otalgia, otorrhea, dizziness, vertigo, headaches related to ears, recent fevers or ear infections.

## 2023-03-01 ENCOUNTER — APPOINTMENT (OUTPATIENT)
Dept: UROLOGY | Facility: CLINIC | Age: 68
End: 2023-03-01
Payer: COMMERCIAL

## 2023-03-01 DIAGNOSIS — R35.0 FREQUENCY OF MICTURITION: ICD-10-CM

## 2023-03-01 DIAGNOSIS — N40.1 BENIGN PROSTATIC HYPERPLASIA WITH LOWER URINARY TRACT SYMPMS: ICD-10-CM

## 2023-03-01 DIAGNOSIS — N13.8 BENIGN PROSTATIC HYPERPLASIA WITH LOWER URINARY TRACT SYMPMS: ICD-10-CM

## 2023-03-01 PROCEDURE — 88112 CYTOPATH CELL ENHANCE TECH: CPT | Mod: 26

## 2023-03-01 PROCEDURE — 99203 OFFICE O/P NEW LOW 30 MIN: CPT

## 2023-03-02 LAB
APPEARANCE: CLEAR
BACTERIA UR CULT: NORMAL
BACTERIA: NEGATIVE
BILIRUBIN URINE: NEGATIVE
BLOOD URINE: NEGATIVE
COLOR: COLORLESS
GLUCOSE QUALITATIVE U: NEGATIVE
HYALINE CASTS: 0 /LPF
KETONES URINE: NEGATIVE
LEUKOCYTE ESTERASE URINE: NEGATIVE
MICROSCOPIC-UA: NORMAL
NITRITE URINE: NEGATIVE
PH URINE: 6.5
PROTEIN URINE: NEGATIVE
PSA FREE FLD-MCNC: 13 %
PSA FREE SERPL-MCNC: 0.31 NG/ML
PSA SERPL-MCNC: 2.35 NG/ML
RED BLOOD CELLS URINE: 0 /HPF
SPECIFIC GRAVITY URINE: 1.01
SQUAMOUS EPITHELIAL CELLS: 0 /HPF
UROBILINOGEN URINE: NORMAL
WHITE BLOOD CELLS URINE: 0 /HPF

## 2023-03-03 LAB — URINE CYTOLOGY: NORMAL

## 2023-03-10 ENCOUNTER — NON-APPOINTMENT (OUTPATIENT)
Age: 68
End: 2023-03-10

## 2023-03-10 ENCOUNTER — APPOINTMENT (OUTPATIENT)
Dept: PULMONOLOGY | Facility: CLINIC | Age: 68
End: 2023-03-10
Payer: COMMERCIAL

## 2023-03-10 VITALS
RESPIRATION RATE: 16 BRPM | BODY MASS INDEX: 25.05 KG/M2 | HEART RATE: 49 BPM | SYSTOLIC BLOOD PRESSURE: 110 MMHG | HEIGHT: 70 IN | WEIGHT: 175 LBS | DIASTOLIC BLOOD PRESSURE: 78 MMHG | TEMPERATURE: 97.1 F | OXYGEN SATURATION: 96 %

## 2023-03-10 DIAGNOSIS — U07.1 COVID-19: ICD-10-CM

## 2023-03-10 PROCEDURE — 94010 BREATHING CAPACITY TEST: CPT

## 2023-03-10 PROCEDURE — 95012 NITRIC OXIDE EXP GAS DETER: CPT

## 2023-03-10 PROCEDURE — 99214 OFFICE O/P EST MOD 30 MIN: CPT | Mod: 25

## 2023-03-10 NOTE — ASSESSMENT
[FreeTextEntry1] : Mr. RIZVI is a 67 year male with a history of facial neuralgia, GERD, rhinitis, H. Pylori, HLD, Menieres disease positive tuberculin skin test now treated, abnormal CT in the past, latent TB s/p INH therapy 1990, NABIL who now comes in for a follow up pulmonary evaluation for latent TB, abnormal CXR, remote NABIL, GERD,- s/p Covid 19 6/2022, 7/2022- stable \par \par Problem 1: Latent TB treated in 1990\par -no need for further testing at this time \par -As for the latent TB infection, 5-13% will go on to develop active disease. No gold standard test for diagnosis. The tuberculin skin test limited sensitivity and specificity, as there is false positives in people who have received BCG; false negatives in people with impaired T-Cell infection. Interferon gamma- release assays, more specific; similar sensitivity to TB skin test (not influenced by BCG). \par \par Problem 2: remote NABIL\par -continue to exercise / observation. recommended to maintain current weight\par -Sleep apnea is associated with adverse clinical consequences which can affect most organ systems. Cardiovascular disease risk includes arrhythmias, atrial fibrillation, hypertension, coronary artery disease, and stroke. Metabolic disorders include diabetes type 2, non-alcoholic fatty liver disease. Mood disorder especially depression; and cognitive decline especially in the elderly. Associations with chronic reflux/Peguero’s esophagus some but not all inclusive. \par -Reasons include arousal consistent with hypopnea; respiratory events most prominent in REM sleep or supine position; therefore sleep staging and body position are important for accurate diagnosis and estimation of AHI. \par  \par Problem 3: GERD\par -No Rx at this time \par -Rule of 2s: avoid eating too much, eating too late, eating too spicy, eating two hours before bed.\par - Things to avoid including overeating, spicy foods, tight clothing, eating within two hours of bed, this list is not all inclusive.\par - For treatments of reflux, possible options discussed including diet control, H2 blockers, PPIs, as well as coating motility agents discussed as treatment options. Timing of meals and proximity of last meal to sleep were discussed. If symptoms persist, a formal gastrointestinal evaluation is needed. \par \par Problem 4: back pain\par - Recommended [employer] Stretches \par -Recommended podiatrist evaluation and check leg lengths \par -Recommended "The Runner's Edge" to evaluate gait \par -Stretches demonstrated to pt in office \par \par Problem 5: Health Maintenance\par -s/p COVID-19 vaccine x4 \par -s/p Covid 19 6/2022, 7/2022\par -s/p flu shot 2022\par -recommended strep pneumonia vaccines: Prevnar-13 vaccine (completed), follow by Pneumo vaccine 23 one year following (pending)\par -recommended early intervention for URIs\par -recommended regular osteoporosis evaluations\par -recommended early dermatological evaluations\par -recommended after the age of 50 to the age of 70, colonoscopy every 5 years\par \par f/u in 6-8 weeks\par pt is encouraged to call or fax the office with any questions or concerns.\par

## 2023-03-10 NOTE — ADDENDUM
[FreeTextEntry1] : Documented by Calvin Pisano acting as a scribe for Dr. Koko Calvo on (03/10/2023).\par \par All medical record entries made by the Scribe were at my, Dr. Koko Calvo's, direction and personally dictated by me on (03/10/2023). I have reviewed the chart and agree that the record accurately reflects my personal performance of the history, physical exam, assessment and plan. I have personally directed, reviewed, and agree with the discharge instructions.

## 2023-03-10 NOTE — HISTORY OF PRESENT ILLNESS
[TextBox_4] : Mr. RIZVI is a 67 year male with a history of facial neuralgia, GERD, rhinitis, H. 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 His chief complaint is\par - he notes feeling well in general \par - he denies difficulty swallowing\par - he denies any visual issues \par - he notes energy level is 8/10 \par - he notes getting 7 hours of sleep, interrupted, nocturia x1\par - he notes TMJ has been quiet \par - he denies coughing or wheezing \par \par - He  denies any headaches, nausea, vomiting, fever, chills, sweats, chest pains, chest pressure, diarrhea, constipation, dysphagia, myalgia, dizziness, leg swelling, leg pain, itchy eyes, itchy ears, or sour taste in the mouth.

## 2023-03-10 NOTE — PROCEDURE
[FreeTextEntry1] : PFT reveals normal flows, with an FEV1 of  3.11L, which is 92% of predicted, with a normal flow volume loop \par \par FENO was 22; a normal value being less than 25\par Fractional exhaled nitric oxide (FENO) is regarded as a simple, noninvasive method for assessing eosinophilic airway inflammation. Produced by a variety of cells within the lung, nitric oxide (NO) concentrations are generally low in healthy individuals. However, high concentrations of NO appear to be involved in nonspecific host defense mechanisms and chronic inflammatory diseases such as asthma. The American Thoracic Society (ATS) therefore has recommended using FENO to aid in the diagnosis and monitoring of eosinophilic airway inflammation and asthma, and for identifying steroid responsive individuals whose chronic respiratory symptoms may be caused by airway inflammation.

## 2023-03-14 ENCOUNTER — APPOINTMENT (OUTPATIENT)
Dept: GASTROENTEROLOGY | Facility: CLINIC | Age: 68
End: 2023-03-14
Payer: COMMERCIAL

## 2023-03-14 VITALS
WEIGHT: 175 LBS | HEIGHT: 70 IN | TEMPERATURE: 97.2 F | OXYGEN SATURATION: 98 % | SYSTOLIC BLOOD PRESSURE: 118 MMHG | HEART RATE: 90 BPM | BODY MASS INDEX: 25.05 KG/M2 | DIASTOLIC BLOOD PRESSURE: 78 MMHG

## 2023-03-14 DIAGNOSIS — R10.13 EPIGASTRIC PAIN: ICD-10-CM

## 2023-03-14 PROCEDURE — 99213 OFFICE O/P EST LOW 20 MIN: CPT

## 2023-03-14 NOTE — HISTORY OF PRESENT ILLNESS
[FreeTextEntry1] : dictation inactive\par \par some recurrent complaints\par now resolved\par Currently off PPI, H2 blocker\par And Tums as needed\par Occasional nonsteroidal use\par History of H. pylori, treated\par \par \par on and off prednisone for Menieres ... Causing constipation \par Using some Coker magnesium tablets

## 2023-03-14 NOTE — ASSESSMENT
[FreeTextEntry1] : Resolved, mild dyspepsia heartburn\par Constipation when he uses corticosteroid\par \par Plan\par Okay to continue magnesium\par Consider H2 blocker or PPI if gastric complaints recur\par No specific work-up at this time

## 2023-04-03 ENCOUNTER — APPOINTMENT (OUTPATIENT)
Dept: OTOLARYNGOLOGY | Facility: CLINIC | Age: 68
End: 2023-04-03
Payer: COMMERCIAL

## 2023-04-03 ENCOUNTER — APPOINTMENT (OUTPATIENT)
Dept: PHARMACY | Facility: CLINIC | Age: 68
End: 2023-04-03
Payer: SELF-PAY

## 2023-04-03 VITALS
HEART RATE: 76 BPM | HEIGHT: 70 IN | BODY MASS INDEX: 25.2 KG/M2 | DIASTOLIC BLOOD PRESSURE: 79 MMHG | SYSTOLIC BLOOD PRESSURE: 118 MMHG | WEIGHT: 176 LBS

## 2023-04-03 PROCEDURE — 99213 OFFICE O/P EST LOW 20 MIN: CPT

## 2023-04-03 PROCEDURE — 92567 TYMPANOMETRY: CPT

## 2023-04-03 PROCEDURE — 92557 COMPREHENSIVE HEARING TEST: CPT

## 2023-04-03 PROCEDURE — V5299A: CUSTOM | Mod: NC

## 2023-04-06 NOTE — HISTORY OF PRESENT ILLNESS
[de-identified] : 69 yo M with left Menieres disease and sudden hearing loss AS. Had flu like symptoms 3 weeks ago and had otalgia AU, dizziness but no full blown vertigo attacks with no change in hearing. Continues to have tinnitus AS. Wears hearing aid AS regularly. No current otalgia, otorrhea, recent ear infections. In the last month, wakes up almost daily with headaches that spontaneously resolves.

## 2023-04-19 ENCOUNTER — APPOINTMENT (OUTPATIENT)
Dept: OPHTHALMOLOGY | Facility: CLINIC | Age: 68
End: 2023-04-19
Payer: COMMERCIAL

## 2023-04-19 ENCOUNTER — NON-APPOINTMENT (OUTPATIENT)
Age: 68
End: 2023-04-19

## 2023-04-19 PROCEDURE — 92083 EXTENDED VISUAL FIELD XM: CPT

## 2023-04-19 PROCEDURE — 92012 INTRM OPH EXAM EST PATIENT: CPT

## 2023-06-25 ENCOUNTER — NON-APPOINTMENT (OUTPATIENT)
Age: 68
End: 2023-06-25

## 2023-06-29 ENCOUNTER — APPOINTMENT (OUTPATIENT)
Dept: OTOLARYNGOLOGY | Facility: CLINIC | Age: 68
End: 2023-06-29
Payer: COMMERCIAL

## 2023-06-29 DIAGNOSIS — H90.42 SENSORINEURAL HEARING LOSS, UNILATERAL, LEFT EAR, WITH UNRESTRICTED HEARING ON THE CONTRALATERAL SIDE: ICD-10-CM

## 2023-06-29 DIAGNOSIS — H69.83 OTHER SPECIFIED DISORDERS OF EUSTACHIAN TUBE, BILATERAL: ICD-10-CM

## 2023-06-29 PROCEDURE — 92557 COMPREHENSIVE HEARING TEST: CPT

## 2023-06-29 PROCEDURE — 99213 OFFICE O/P EST LOW 20 MIN: CPT

## 2023-06-29 PROCEDURE — 92567 TYMPANOMETRY: CPT

## 2023-06-29 RX ORDER — MONTELUKAST 10 MG/1
10 TABLET, FILM COATED ORAL DAILY
Qty: 90 | Refills: 3 | Status: DISCONTINUED | COMMUNITY
Start: 2022-05-02 | End: 2023-06-29

## 2023-06-29 RX ORDER — FLUTICASONE PROPIONATE 50 UG/1
50 SPRAY, METERED NASAL
Qty: 48 | Refills: 0 | Status: ACTIVE | COMMUNITY
Start: 2023-06-29 | End: 1900-01-01

## 2023-06-29 RX ORDER — ACETYLCYSTEINE 600 MG
600 CAPSULE ORAL
Qty: 90 | Refills: 0 | Status: DISCONTINUED | COMMUNITY
Start: 2022-11-21 | End: 2023-06-29

## 2023-06-29 NOTE — DATA REVIEWED
[de-identified] : Hearing WNL, AD\par Moderate to moderate-severe SNHL, AS\par Type A tymp, AU\par

## 2023-06-29 NOTE — REVIEW OF SYSTEMS
[As Noted in HPI] : as noted in HPI [Hoarseness] : hoarseness [Throat Pain] : throat pain [Negative] : Constitutional

## 2023-06-29 NOTE — PHYSICAL EXAM
[Midline] : trachea located in midline position [Normal] : orientation to person, place, and time: normal [de-identified] : erythematous [de-identified] : erythematous

## 2023-06-29 NOTE — HISTORY OF PRESENT ILLNESS
[de-identified] : 67 yo M with left Menieres disease and sudden hearing loss AS presents with sore throat and cough for two weeks and increased tinnitus AS and fullness and popping AD that started earlier this week. Went to urgent care earlier in the week - prescribed Augmentin and Flonase. No otalgia, otorrhea, dizziness/vertigo or headaches related to hearing.

## 2023-10-05 ENCOUNTER — APPOINTMENT (OUTPATIENT)
Dept: OTOLARYNGOLOGY | Facility: CLINIC | Age: 68
End: 2023-10-05
Payer: COMMERCIAL

## 2023-10-05 PROCEDURE — 92557 COMPREHENSIVE HEARING TEST: CPT

## 2023-10-05 PROCEDURE — 99213 OFFICE O/P EST LOW 20 MIN: CPT

## 2023-10-05 PROCEDURE — 92567 TYMPANOMETRY: CPT

## 2023-10-05 RX ORDER — PREDNISONE 10 MG/1
10 TABLET ORAL
Qty: 70 | Refills: 0 | Status: DISCONTINUED | COMMUNITY
Start: 2023-06-29 | End: 2023-10-05

## 2023-10-26 ENCOUNTER — APPOINTMENT (OUTPATIENT)
Dept: PULMONOLOGY | Facility: CLINIC | Age: 68
End: 2023-10-26

## 2023-10-27 ENCOUNTER — NON-APPOINTMENT (OUTPATIENT)
Age: 68
End: 2023-10-27

## 2023-11-20 ENCOUNTER — APPOINTMENT (OUTPATIENT)
Dept: ORTHOPEDIC SURGERY | Facility: CLINIC | Age: 68
End: 2023-11-20

## 2023-11-27 ENCOUNTER — APPOINTMENT (OUTPATIENT)
Dept: OPHTHALMOLOGY | Facility: CLINIC | Age: 68
End: 2023-11-27
Payer: COMMERCIAL

## 2023-11-27 ENCOUNTER — NON-APPOINTMENT (OUTPATIENT)
Age: 68
End: 2023-11-27

## 2023-11-27 PROCEDURE — 92012 INTRM OPH EXAM EST PATIENT: CPT

## 2023-11-27 PROCEDURE — 92083 EXTENDED VISUAL FIELD XM: CPT

## 2023-11-27 PROCEDURE — 92133 CPTRZD OPH DX IMG PST SGM ON: CPT

## 2023-11-28 ENCOUNTER — APPOINTMENT (OUTPATIENT)
Dept: ORTHOPEDIC SURGERY | Facility: CLINIC | Age: 68
End: 2023-11-28

## 2023-11-30 ENCOUNTER — APPOINTMENT (OUTPATIENT)
Dept: ORTHOPEDIC SURGERY | Facility: CLINIC | Age: 68
End: 2023-11-30
Payer: COMMERCIAL

## 2023-11-30 ENCOUNTER — NON-APPOINTMENT (OUTPATIENT)
Age: 68
End: 2023-11-30

## 2023-11-30 DIAGNOSIS — M24.9 JOINT DERANGEMENT, UNSPECIFIED: ICD-10-CM

## 2023-11-30 DIAGNOSIS — M79.672 PAIN IN RIGHT FOOT: ICD-10-CM

## 2023-11-30 DIAGNOSIS — M76.71 PERONEAL TENDINITIS, RIGHT LEG: ICD-10-CM

## 2023-11-30 DIAGNOSIS — M76.72 PERONEAL TENDINITIS, LEFT LEG: ICD-10-CM

## 2023-11-30 DIAGNOSIS — M79.671 PAIN IN RIGHT FOOT: ICD-10-CM

## 2023-11-30 PROCEDURE — 73630 X-RAY EXAM OF FOOT: CPT | Mod: 50

## 2023-11-30 PROCEDURE — 73600 X-RAY EXAM OF ANKLE: CPT | Mod: LT

## 2023-11-30 PROCEDURE — 99214 OFFICE O/P EST MOD 30 MIN: CPT

## 2023-12-11 ENCOUNTER — APPOINTMENT (OUTPATIENT)
Dept: PHARMACY | Facility: CLINIC | Age: 68
End: 2023-12-11
Payer: SELF-PAY

## 2023-12-11 PROCEDURE — V5299A: CUSTOM

## 2023-12-12 ENCOUNTER — APPOINTMENT (OUTPATIENT)
Dept: ORTHOPEDIC SURGERY | Facility: CLINIC | Age: 68
End: 2023-12-12

## 2023-12-15 ENCOUNTER — OUTPATIENT (OUTPATIENT)
Dept: OUTPATIENT SERVICES | Facility: HOSPITAL | Age: 68
LOS: 1 days | End: 2023-12-15
Payer: COMMERCIAL

## 2023-12-15 ENCOUNTER — APPOINTMENT (OUTPATIENT)
Dept: MRI IMAGING | Facility: CLINIC | Age: 68
End: 2023-12-15
Payer: COMMERCIAL

## 2023-12-15 DIAGNOSIS — M24.172 OTHER ARTICULAR CARTILAGE DISORDERS, LEFT ANKLE: ICD-10-CM

## 2023-12-15 DIAGNOSIS — Z00.8 ENCOUNTER FOR OTHER GENERAL EXAMINATION: ICD-10-CM

## 2023-12-15 PROCEDURE — 73721 MRI JNT OF LWR EXTRE W/O DYE: CPT | Mod: 26,LT

## 2023-12-15 PROCEDURE — 73721 MRI JNT OF LWR EXTRE W/O DYE: CPT

## 2023-12-20 ENCOUNTER — APPOINTMENT (OUTPATIENT)
Dept: ORTHOPEDIC SURGERY | Facility: CLINIC | Age: 68
End: 2023-12-20
Payer: COMMERCIAL

## 2023-12-20 ENCOUNTER — NON-APPOINTMENT (OUTPATIENT)
Age: 68
End: 2023-12-20

## 2023-12-20 DIAGNOSIS — S86.312D STRAIN OF MUSCLE(S) AND TENDON(S) OF PERONEAL MUSCLE GROUP AT LOWER LEG LEVEL, LEFT LEG, SUBSEQUENT ENCOUNTER: ICD-10-CM

## 2023-12-20 DIAGNOSIS — M24.172 OTHER ARTICULAR CARTILAGE DISORDERS, LEFT ANKLE: ICD-10-CM

## 2023-12-20 DIAGNOSIS — R93.7 ABNORMAL FINDINGS ON DIAGNOSTIC IMAGING OF OTHER PARTS OF MUSCULOSKELETAL SYSTEM: ICD-10-CM

## 2023-12-20 DIAGNOSIS — Q66.89 OTHER SPECIFIED CONGENITAL DEFORMITIES OF FEET: ICD-10-CM

## 2023-12-20 DIAGNOSIS — M94.279 CHONDROMALACIA, UNSPECIFIED ANKLE AND JOINTS OF FOOT: ICD-10-CM

## 2023-12-20 PROCEDURE — 99214 OFFICE O/P EST MOD 30 MIN: CPT

## 2023-12-22 PROBLEM — R93.7 BONE MARROW EDEMA: Status: ACTIVE | Noted: 2023-12-22

## 2023-12-22 PROBLEM — S86.312D PERONEAL TENDON TEAR, LEFT, SUBSEQUENT ENCOUNTER: Status: ACTIVE | Noted: 2023-12-22

## 2023-12-22 PROBLEM — M24.172 DERANGEMENT OF ANKLE, LEFT: Status: ACTIVE | Noted: 2023-11-30

## 2023-12-22 PROBLEM — Q66.89 TARSAL COALITION: Status: ACTIVE | Noted: 2023-12-20

## 2023-12-22 PROBLEM — M94.279 CHONDROMALACIA OF ANKLE OR JOINT OF FOOT: Status: ACTIVE | Noted: 2023-12-02

## 2024-01-02 ENCOUNTER — NON-APPOINTMENT (OUTPATIENT)
Age: 69
End: 2024-01-02

## 2024-01-08 ENCOUNTER — APPOINTMENT (OUTPATIENT)
Dept: ORTHOPEDIC SURGERY | Facility: CLINIC | Age: 69
End: 2024-01-08
Payer: COMMERCIAL

## 2024-01-08 VITALS — HEIGHT: 70 IN | BODY MASS INDEX: 25.2 KG/M2 | WEIGHT: 176 LBS

## 2024-01-08 DIAGNOSIS — M22.42 CHONDROMALACIA PATELLAE, LEFT KNEE: ICD-10-CM

## 2024-01-08 DIAGNOSIS — M22.41 CHONDROMALACIA PATELLAE, RIGHT KNEE: ICD-10-CM

## 2024-01-08 PROCEDURE — 73562 X-RAY EXAM OF KNEE 3: CPT | Mod: 50

## 2024-01-08 PROCEDURE — 99203 OFFICE O/P NEW LOW 30 MIN: CPT

## 2024-01-08 NOTE — DISCUSSION/SUMMARY
[de-identified] : The patient was advised of the diagnosis.  The natural history of the pathology was explained in full to the patient in layman's terms. All questions were answered.  The risks and benefits of surgical and non-surgical treatment alternatives were explained in full to the patient.

## 2024-01-08 NOTE — DISCUSSION/SUMMARY
[de-identified] : The patient was advised of the diagnosis.  The natural history of the pathology was explained in full to the patient in layman's terms. All questions were answered.  The risks and benefits of surgical and non-surgical treatment alternatives were explained in full to the patient.

## 2024-01-08 NOTE — IMAGING
[Bilateral] : knee bilaterally [AP] : anteroposterior [Lateral] : lateral [[place]] : skyline [Mild patellofemoral OA] : Mild patellofemoral OA [de-identified] : Right knee: No effusion.  Crepitus.  ROM 0-130.  Lig stable.  NVI.  Left knee: No effusion.  Crepitus.  ROM 0-130.  Lig stable.  NVI.

## 2024-01-08 NOTE — ASSESSMENT
[FreeTextEntry1] : Mild PF OA - pain is not severe.  Discussed options - recc PT and NSAIDs prn, discussed HA injections if pain persists.

## 2024-01-08 NOTE — IMAGING
[Bilateral] : knee bilaterally [AP] : anteroposterior [Lateral] : lateral [[place]] : skyline [Mild patellofemoral OA] : Mild patellofemoral OA [de-identified] : Right knee: No effusion.  Crepitus.  ROM 0-130.  Lig stable.  NVI.  Left knee: No effusion.  Crepitus.  ROM 0-130.  Lig stable.  NVI.

## 2024-01-26 ENCOUNTER — APPOINTMENT (OUTPATIENT)
Dept: PULMONOLOGY | Facility: CLINIC | Age: 69
End: 2024-01-26
Payer: COMMERCIAL

## 2024-01-26 VITALS
HEART RATE: 80 BPM | OXYGEN SATURATION: 98 % | DIASTOLIC BLOOD PRESSURE: 80 MMHG | BODY MASS INDEX: 25.2 KG/M2 | TEMPERATURE: 95.4 F | RESPIRATION RATE: 16 BRPM | SYSTOLIC BLOOD PRESSURE: 122 MMHG | WEIGHT: 176 LBS | HEIGHT: 70 IN

## 2024-01-26 DIAGNOSIS — R93.89 ABNORMAL FINDINGS ON DIAGNOSTIC IMAGING OF OTHER SPECIFIED BODY STRUCTURES: ICD-10-CM

## 2024-01-26 DIAGNOSIS — J45.20 MILD INTERMITTENT ASTHMA, UNCOMPLICATED: ICD-10-CM

## 2024-01-26 DIAGNOSIS — K21.9 GASTRO-ESOPHAGEAL REFLUX DISEASE W/OUT ESOPHAGITIS: ICD-10-CM

## 2024-01-26 DIAGNOSIS — R06.02 SHORTNESS OF BREATH: ICD-10-CM

## 2024-01-26 DIAGNOSIS — J31.0 CHRONIC RHINITIS: ICD-10-CM

## 2024-01-26 DIAGNOSIS — R76.11 NONSPECIFIC REACTION TO TUBERCULIN SKIN TEST W/OUT ACTIVE TUBERCULOSIS: ICD-10-CM

## 2024-01-26 DIAGNOSIS — G47.33 OBSTRUCTIVE SLEEP APNEA (ADULT) (PEDIATRIC): ICD-10-CM

## 2024-01-26 PROCEDURE — 95012 NITRIC OXIDE EXP GAS DETER: CPT

## 2024-01-26 PROCEDURE — 94727 GAS DIL/WSHOT DETER LNG VOL: CPT

## 2024-01-26 PROCEDURE — 94729 DIFFUSING CAPACITY: CPT

## 2024-01-26 PROCEDURE — 99214 OFFICE O/P EST MOD 30 MIN: CPT | Mod: 25

## 2024-01-26 PROCEDURE — 94010 BREATHING CAPACITY TEST: CPT

## 2024-01-26 RX ORDER — BUDESONIDE AND FORMOTEROL FUMARATE DIHYDRATE 160; 4.5 UG/1; UG/1
160-4.5 AEROSOL RESPIRATORY (INHALATION) TWICE DAILY
Qty: 1 | Refills: 2 | Status: ACTIVE | COMMUNITY
Start: 2024-01-26 | End: 1900-01-01

## 2024-01-26 NOTE — ADDENDUM
[FreeTextEntry1] : Documented by Cezar Leo acting as a scribe for Dr. Koko Calvo on 01/26/2024. All medical record entries made by the Scribe were at my, Dr. Koko Calvo's, direction and personally dictated by me on 01/26/2024. I have reviewed the chart and agree that the record accurately reflects my personal performance of the history, physical exam, assessment and plan. I have also personally directed, reviewed, and agree with the discharge instructions.

## 2024-01-26 NOTE — PROCEDURE
[FreeTextEntry1] : Full PFT reveals normal flows; FEV1 was  2.93L which is 84% of predicted; normal lung volumes; normal diffusion at 24, which is 111% of predicted; faint inspiratory limb. PFTs were performed to evaluate for SOB  FENO was 22; a normal value being less than 25 Fractional exhaled nitric oxide (FENO) is regarded as a simple, noninvasive method for assessing eosinophilic airway inflammation. Produced by a variety of cells within the lung, nitric oxide (NO) concentrations are generally low in healthy individuals. However, high concentrations of NO appear to be involved in nonspecific host defense mechanisms and chronic inflammatory diseases such as asthma. The American Thoracic Society (ATS) therefore has recommended using FENO to aid in the diagnosis and monitoring of eosinophilic airway inflammation and asthma, and for identifying steroid responsive individuals whose chronic respiratory symptoms may be caused by airway inflammation.   The American Thoracic Society (ATS) strongly recommends the use of FeNO measurement to aid in the assessment, management, and long-term monitoring of asthma. In their 2011 clinical practice guideline, the ATS emphasizes the importance of using FeNO.

## 2024-01-26 NOTE — ASSESSMENT
[FreeTextEntry1] : Mr. RIZVI is a 68 year male with a history of facial neuralgia, GERD, rhinitis, H. Pylori, HLD, Menieres disease positive tuberculin skin test now treated, abnormal CT in the past, latent TB s/p INH therapy 1990, NABIL who now comes in for a follow up pulmonary evaluation for latent TB, abnormal CXR, remote NABIL, GERD,- s/p Covid 19 6/2022, 7/2022- stable s/p URI - SOB c/w bronchospasm   Problem 1: Latent TB treated in 1990 -no need for further testing at this time  -As for the latent TB infection, 5-13% will go on to develop active disease. No gold standard test for diagnosis. The tuberculin skin test limited sensitivity and specificity, as there is false positives in people who have received BCG; false negatives in people with impaired T-Cell infection. Interferon gamma- release assays, more specific; similar sensitivity to TB skin test (not influenced by BCG).   problem 1 A: post URI SOB c/w bronchospasm -add Symbicort 160 2 inhalations BID  Problem 2: remote NABIL -continue to exercise / observation. recommended to maintain current weight -Sleep apnea is associated with adverse clinical consequences which can affect most organ systems. Cardiovascular disease risk includes arrhythmias, atrial fibrillation, hypertension, coronary artery disease, and stroke. Metabolic disorders include diabetes type 2, non-alcoholic fatty liver disease. Mood disorder especially depression; and cognitive decline especially in the elderly. Associations with chronic reflux/Peguero's esophagus some but not all inclusive.  -Reasons include arousal consistent with hypopnea; respiratory events most prominent in REM sleep or supine position; therefore sleep staging and body position are important for accurate diagnosis and estimation of AHI.    Problem 3: GERD -No Rx at this time  -Rule of 2s: avoid eating too much, eating too late, eating too spicy, eating two hours before bed. - Things to avoid including overeating, spicy foods, tight clothing, eating within two hours of bed, this list is not all inclusive. - For treatments of reflux, possible options discussed including diet control, H2 blockers, PPIs, as well as coating motility agents discussed as treatment options. Timing of meals and proximity of last meal to sleep were discussed. If symptoms persist, a formal gastrointestinal evaluation is needed.   Problem 4: back pain -Recommended [employer] Stretches  -Recommended podiatrist evaluation and check leg lengths  -Recommended "The Runner's Edge" to evaluate gait  -Stretches demonstrated to pt in office   Problem 5: Health Maintenance -s/p COVID-19 vaccine x4  -s/p Covid 19 6/2022, 7/2022 -s/p flu shot 2022 -recommended strep pneumonia vaccines: Prevnar-13 vaccine (completed), follow by Pneumo vaccine 23 one year following (pending) -recommended early intervention for URIs -recommended regular osteoporosis evaluations -recommended early dermatological evaluations -recommended after the age of 50 to the age of 70, colonoscopy every 5 years  f/u in 6-8 weeks pt is encouraged to call or fax the office with any questions or concerns.

## 2024-01-26 NOTE — REASON FOR VISIT
[Follow-Up] : a follow-up visit [TextBox_44] : latent TB, abnormal CXR, remote NABIL, GERD, RADS/SOB

## 2024-01-26 NOTE — HISTORY OF PRESENT ILLNESS
[TextBox_4] : Mr. RIZVI is a 68 year male with a history of facial neuralgia, GERD, rhinitis, H. 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 His chief complaint is  -he notes feeling generally well -he notes exercising (stretching, weights) -he notes energy levels are good -he notes s/p URI  -he notes having a fever, cough, and breathing issues with his s/p URI  -he notes residual breathing issues  -he notes being recovered now  -he notes bowels are regular -he notes weight is stable -he notes vision is stable -he notes good quality of sleep -he notes waking up frequently at night  -he notes nocturia  -he notes drinking before bed  -he notes lower back pain  -he notes his Sx for his past URI were worse then when he had COVID 19   - He  denies any headaches, nausea, vomiting, fever, chills, sweats, chest pains, chest pressure, diarrhea, constipation, dysphagia, myalgia, dizziness, leg swelling, leg pain, itchy eyes, itchy ears, or sour taste in the mouth.

## 2024-02-01 ENCOUNTER — APPOINTMENT (OUTPATIENT)
Dept: VASCULAR SURGERY | Facility: CLINIC | Age: 69
End: 2024-02-01
Payer: COMMERCIAL

## 2024-02-01 ENCOUNTER — APPOINTMENT (OUTPATIENT)
Dept: VASCULAR SURGERY | Facility: CLINIC | Age: 69
End: 2024-02-01

## 2024-02-01 VITALS
HEIGHT: 70 IN | BODY MASS INDEX: 24.77 KG/M2 | DIASTOLIC BLOOD PRESSURE: 82 MMHG | WEIGHT: 173 LBS | HEART RATE: 76 BPM | SYSTOLIC BLOOD PRESSURE: 120 MMHG

## 2024-02-01 DIAGNOSIS — I83.893 VARICOSE VEINS OF BILATERAL LOWER EXTREMITIES WITH OTHER COMPLICATIONS: ICD-10-CM

## 2024-02-01 PROCEDURE — 99203 OFFICE O/P NEW LOW 30 MIN: CPT

## 2024-02-01 RX ORDER — TRIAMTERENE AND HYDROCHLOROTHIAZIDE 25; 37.5 MG/1; MG/1
37.5-25 TABLET ORAL DAILY
Qty: 90 | Refills: 3 | Status: ACTIVE | COMMUNITY
Start: 2020-08-19 | End: 1900-01-01

## 2024-02-02 PROBLEM — I83.893 SYMPTOMATIC VARICOSE VEINS OF BOTH LOWER EXTREMITIES: Status: ACTIVE | Noted: 2024-02-02

## 2024-04-03 ENCOUNTER — APPOINTMENT (OUTPATIENT)
Dept: OTOLARYNGOLOGY | Facility: CLINIC | Age: 69
End: 2024-04-03
Payer: COMMERCIAL

## 2024-04-03 DIAGNOSIS — H81.02 MENIERE'S DISEASE, LEFT EAR: ICD-10-CM

## 2024-04-03 DIAGNOSIS — H90.5 UNSPECIFIED SENSORINEURAL HEARING LOSS: ICD-10-CM

## 2024-04-03 PROCEDURE — 92567 TYMPANOMETRY: CPT

## 2024-04-03 PROCEDURE — 92557 COMPREHENSIVE HEARING TEST: CPT

## 2024-04-03 PROCEDURE — 36415 COLL VENOUS BLD VENIPUNCTURE: CPT

## 2024-04-03 PROCEDURE — 99213 OFFICE O/P EST LOW 20 MIN: CPT

## 2024-04-05 LAB
ANION GAP SERPL CALC-SCNC: 13 MMOL/L
BUN SERPL-MCNC: 17 MG/DL
CALCIUM SERPL-MCNC: 9.2 MG/DL
CHLORIDE SERPL-SCNC: 99 MMOL/L
CO2 SERPL-SCNC: 28 MMOL/L
CREAT SERPL-MCNC: 1 MG/DL
EGFR: 82 ML/MIN/1.73M2
GLUCOSE SERPL-MCNC: 64 MG/DL
POTASSIUM SERPL-SCNC: 4.7 MMOL/L
SODIUM SERPL-SCNC: 140 MMOL/L
TRIGL SERPL-MCNC: 105 MG/DL

## 2024-04-08 PROBLEM — H90.5 HEARING LOSS, NEURAL, UNILATERAL: Status: ACTIVE | Noted: 2021-04-24

## 2024-04-08 PROBLEM — H81.02 MENIERE DISEASE, LEFT: Status: ACTIVE | Noted: 2020-01-08

## 2024-04-08 NOTE — HISTORY OF PRESENT ILLNESS
[de-identified] : 69 year man, 6 month follow up for Left Meniere's Disease. History of unilateral hearing loss. Patient states, he feels as if his hearing has worsened since last visit - unsure how "accurate" that is, not sure if it a true hearing loss.

## 2024-04-08 NOTE — DATA REVIEWED
[de-identified] : RE: Hearing WNL. LE: Severe rising to moderately severe SNHL. Type A TympLauren.

## 2024-05-09 ENCOUNTER — APPOINTMENT (OUTPATIENT)
Dept: PHARMACY | Facility: CLINIC | Age: 69
End: 2024-05-09
Payer: SELF-PAY

## 2024-05-09 PROCEDURE — V5299A: CUSTOM

## 2024-05-20 ENCOUNTER — APPOINTMENT (OUTPATIENT)
Dept: OPHTHALMOLOGY | Facility: CLINIC | Age: 69
End: 2024-05-20
Payer: COMMERCIAL

## 2024-05-20 ENCOUNTER — NON-APPOINTMENT (OUTPATIENT)
Age: 69
End: 2024-05-20

## 2024-05-20 PROCEDURE — 92014 COMPRE OPH EXAM EST PT 1/>: CPT

## 2024-05-20 PROCEDURE — 92133 CPTRZD OPH DX IMG PST SGM ON: CPT

## 2024-05-20 PROCEDURE — 92083 EXTENDED VISUAL FIELD XM: CPT

## 2024-06-28 NOTE — HISTORY OF PRESENT ILLNESS
[Gradual] : gradual [1] : 2 [Dull/Aching] : dull/aching [Throbbing] : throbbing [Constant] : constant [Leisure] : leisure [Nothing helps with pain getting better] : Nothing helps with pain getting better [Standing] : standing [Stairs] : stairs [de-identified] : B/L knee pain x 6 months.  Former runner - stopped a few years ago and restarted about 6 months ago.  Pain started after returning to running.  Worse with stairs.  Not currently running and pain has been better but gets worse when he tries to run. [] : no [FreeTextEntry5] : 01/08/2024 DEVI RIZVI is 68 years old male who present evaluation of Lion knee

## 2024-06-28 NOTE — HISTORY OF PRESENT ILLNESS
[Gradual] : gradual [1] : 2 [Dull/Aching] : dull/aching [Throbbing] : throbbing [Constant] : constant [Leisure] : leisure [Nothing helps with pain getting better] : Nothing helps with pain getting better [Standing] : standing [Stairs] : stairs [de-identified] : B/L knee pain x 6 months.  Former runner - stopped a few years ago and restarted about 6 months ago.  Pain started after returning to running.  Worse with stairs.  Not currently running and pain has been better but gets worse when he tries to run. [] : no [FreeTextEntry5] : 01/08/2024 DEVI RIZVI is 68 years old male who present evaluation of Lion knee

## 2024-07-26 ENCOUNTER — APPOINTMENT (OUTPATIENT)
Dept: PULMONOLOGY | Facility: CLINIC | Age: 69
End: 2024-07-26
Payer: COMMERCIAL

## 2024-07-26 VITALS
TEMPERATURE: 97.2 F | WEIGHT: 173 LBS | RESPIRATION RATE: 16 BRPM | BODY MASS INDEX: 24.77 KG/M2 | DIASTOLIC BLOOD PRESSURE: 82 MMHG | HEIGHT: 70 IN | SYSTOLIC BLOOD PRESSURE: 116 MMHG | HEART RATE: 54 BPM | OXYGEN SATURATION: 98 %

## 2024-07-26 DIAGNOSIS — Z23 ENCOUNTER FOR IMMUNIZATION: ICD-10-CM

## 2024-07-26 DIAGNOSIS — R93.89 ABNORMAL FINDINGS ON DIAGNOSTIC IMAGING OF OTHER SPECIFIED BODY STRUCTURES: ICD-10-CM

## 2024-07-26 DIAGNOSIS — Z22.7 LATENT TUBERCULOSIS: ICD-10-CM

## 2024-07-26 DIAGNOSIS — J31.0 CHRONIC RHINITIS: ICD-10-CM

## 2024-07-26 DIAGNOSIS — G47.33 OBSTRUCTIVE SLEEP APNEA (ADULT) (PEDIATRIC): ICD-10-CM

## 2024-07-26 DIAGNOSIS — K21.9 GASTRO-ESOPHAGEAL REFLUX DISEASE W/OUT ESOPHAGITIS: ICD-10-CM

## 2024-07-26 DIAGNOSIS — R06.02 SHORTNESS OF BREATH: ICD-10-CM

## 2024-07-26 PROCEDURE — 99214 OFFICE O/P EST MOD 30 MIN: CPT | Mod: 25

## 2024-07-26 PROCEDURE — 90715 TDAP VACCINE 7 YRS/> IM: CPT

## 2024-07-26 PROCEDURE — 90471 IMMUNIZATION ADMIN: CPT

## 2024-07-26 PROCEDURE — 95012 NITRIC OXIDE EXP GAS DETER: CPT

## 2024-07-26 PROCEDURE — 94010 BREATHING CAPACITY TEST: CPT

## 2024-07-26 NOTE — PROCEDURE
[FreeTextEntry1] : PFTs revealed normal flows; FEV1 was 3.17L, which is 95% of predicted; abnormal inspiratory limb. PFTs were performed to evaluate for SOB  FENO was 32; a normal value being less than 25 Fractional exhaled nitric oxide (FENO) is regarded as a simple, noninvasive method for assessing eosinophilic airway inflammation. Produced by a variety of cells within the lung, nitric oxide (NO) concentrations are generally low in healthy individuals. However, high concentrations of NO appear to be involved in nonspecific host defense mechanisms and chronic inflammatory diseases such as asthma. The American Thoracic Society (ATS) therefore has recommended using FENO to aid in the diagnosis and monitoring of eosinophilic airway inflammation and asthma, and for identifying steroid responsive individuals whose chronic respiratory symptoms may be caused by airway inflammation.

## 2024-07-26 NOTE — ADDENDUM
[FreeTextEntry1] : Documented by Gretchen Holloway acting as a scribe for Dr. Koko Calvo on 07/26/2024. All medical record entries made by the Scribe were at my, Dr. Koko Calvo's, direction and personally dictated by me on 07/26/2024. I have reviewed the chart and agree that the record accurately reflects my personal performance of the history, physical exam, assessment and plan. I have also personally directed, reviewed, and agree with the discharge instructions.

## 2024-07-26 NOTE — ASSESSMENT
[FreeTextEntry1] : Mr. RIZVI is a 69 year old male with a history of facial neuralgia, GERD, rhinitis, H. Pylori, HLD, Meniere's disease positive tuberculin skin test now treated, abnormal CT in the past, latent TB s/p INH therapy 1990, NABIL who now comes in for a follow up pulmonary evaluation for latent TB, abnormal CXR, remote NABIL, GERD- s/p Covid 19 6/2022, 7/2022- stable s/p URI - SOB c/w bronchospasm- stable  Problem 1: Latent TB treated in 1990 -no need for further testing at this time  -As for the latent TB infection, 5-13% will go on to develop active disease. No gold standard test for diagnosis. The tuberculin skin test limited sensitivity and specificity, as there is false positives in people who have received BCG; false negatives in people with impaired T-Cell infection. Interferon gamma- release assays, more specific; similar sensitivity to TB skin test (not influenced by BCG).   problem 1 A: post URI SOB c/w bronchospasm -Symbicort 160 2 inhalations BID prior  Problem 2: remote NABIL -continue to exercise / observation. recommended to maintain current weight -Sleep apnea is associated with adverse clinical consequences which can affect most organ systems. Cardiovascular disease risk includes arrhythmias, atrial fibrillation, hypertension, coronary artery disease, and stroke. Metabolic disorders include diabetes type 2, non-alcoholic fatty liver disease. Mood disorder especially depression; and cognitive decline especially in the elderly. Associations with chronic reflux/Peguero's esophagus some but not all inclusive.  -Reasons include arousal consistent with hypopnea; respiratory events most prominent in REM sleep or supine position; therefore sleep staging and body position are important for accurate diagnosis and estimation of AHI.    Problem 3: GERD -No Rx at this time  -Rule of 2s: avoid eating too much, eating too late, eating too spicy, eating two hours before bed. - Things to avoid including overeating, spicy foods, tight clothing, eating within two hours of bed, this list is not all inclusive. - For treatments of reflux, possible options discussed including diet control, H2 blockers, PPIs, as well as coating motility agents discussed as treatment options. Timing of meals and proximity of last meal to sleep were discussed. If symptoms persist, a formal gastrointestinal evaluation is needed.   Problem 4: back pain (resolved) -Recommended [employer] Stretches  -Recommended podiatrist evaluation and check leg lengths  -Recommended "The Runner's Edge" to evaluate gait  -Stretches demonstrated to pt in office   Problem 5: Health Maintenance -Statin Rx: recommended CoQ10 200mg QD and vitamin D -s/p COVID-19 vaccine x4  -s/p Covid 19 6/2022, 7/2022 -s/p flu shot 2023 -recommended strep pneumonia vaccines: Prevnar-13 vaccine (completed), follow by Pneumo vaccine 23 one year following (pending) -recommended early intervention for URIs -recommended regular osteoporosis evaluations -recommended early dermatological evaluations -recommended after the age of 50 to the age of 70, colonoscopy every 5 years  F/P in 6 months pt is encouraged to call or fax the office with any questions or concerns.
no abdominal pain/no diarrhea

## 2024-07-26 NOTE — HISTORY OF PRESENT ILLNESS
[TextBox_4] : Mr. RIZVI is a 69 year old male with a history of facial neuralgia, GERD, rhinitis, H. 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 His chief complaint is  -he notes feeling generally well  -he notes exercising  (walking, [employer]) -he notes R-sided abdominal pain improved s/p Michael Samson stretches -he notes energy levels are good  -he notes getting enough sleep  -he notes sleeping for 6-7 hours, restfully -he notes bowels are regular  -he notes his senses of smell and taste are stable  -he denies seasonal allergies -he denies sinus congestion, rhinitis, or PNDrip  -he notes diet is good  -he notes appetite is stable  -he denies taking any new medications, vitamins, or supplements  -he notes losing weight intentionally (smaller portion sizes) -he notes his biggest issue is stress from work -he notes vision is stable  -he notes he's on Rx to control his Meniere's -he notes prior migraines have resolved  -he denies any headaches, nausea, emesis, fever, chills, sweats, chest pain, chest pressure, coughing, wheezing, palpitations, diarrhea, constipation, dysphagia, vertigo, arthralgias, leg swelling, itchy eyes, itchy ears, heartburn, reflux, or sour taste in the mouth.

## 2024-09-08 ENCOUNTER — NON-APPOINTMENT (OUTPATIENT)
Age: 69
End: 2024-09-08

## 2024-09-09 ENCOUNTER — APPOINTMENT (OUTPATIENT)
Dept: CARDIOLOGY | Facility: CLINIC | Age: 69
End: 2024-09-09
Payer: COMMERCIAL

## 2024-09-09 VITALS
WEIGHT: 173 LBS | SYSTOLIC BLOOD PRESSURE: 121 MMHG | BODY MASS INDEX: 24.82 KG/M2 | HEART RATE: 70 BPM | DIASTOLIC BLOOD PRESSURE: 82 MMHG | OXYGEN SATURATION: 99 %

## 2024-09-09 PROCEDURE — 93000 ELECTROCARDIOGRAM COMPLETE: CPT

## 2024-09-09 PROCEDURE — 99204 OFFICE O/P NEW MOD 45 MIN: CPT

## 2024-09-10 NOTE — REASON FOR VISIT
[FreeTextEntry1] : 69 M PMH HLD, BPH, R lateral epicondylitis, Menier's disease, presenting for a cardiological evaluation. Pt was previously seen in 2014 for CP. Work up revealed 30 stenosis in LAD. Pt was lost to f/u.  Pt w/ prior sedentary lifestyle, now exercises regularly, including stretching, jogging and weights.  Pt denies CP, SOLOMON, SOB, diaphoresis, LE edema, orthopnea. Pt is at appropriate weight/BMI and maintains healthy diet.

## 2024-09-10 NOTE — ASSESSMENT
[FreeTextEntry1] : 69 M PMH HLD, BPH, R lateral epicondylitis, Menier's disease, presenting for a cardiological evaluation. Pt was previously seen in 2014 for CP. Work up revealed 30 stenosis in LAD. Pt was lost to f/u.  Pt w/ prior sedentary lifestyle, now exercises regularly, including stretching, jogging and weights.  Pt denies CP, SOLOMON, SOB, diaphoresis, LE edema, orthopnea. Pt is at appropriate weight/BMI and maintains healthy diet.   #CAD -pt w/prior LAD 30% obstruction, currently asymptomatic - except for SOLOMON at times -EKG wnl -would refer for echo and stress test in light of known non-obstructive cad and solomon - start Aspirin for primary prevention -c/w atorvastatin 20 mg hs -f/u fasting bw: a1c, cbc, cmp, lipir panel, TSH, -RTC q6m f/u

## 2024-09-10 NOTE — END OF VISIT
[FreeTextEntry3] :   I, Dr. Michaela Salinas, personally performed the evaluation and management (E/M) services for this new patient.  That E/M includes conducting the initial examination, assessing all conditions, and establishing the plan of care.  Today, my medical resident, was here to observe my evaluation and management services for this patient to be followed going forward. [Time Spent: ___ minutes] : I have spent [unfilled] minutes of time on the encounter which excludes teaching and separately reported services.

## 2024-09-12 LAB
ALBUMIN SERPL ELPH-MCNC: 4.2 G/DL
ALP BLD-CCNC: 70 U/L
ALT SERPL-CCNC: 29 U/L
ANION GAP SERPL CALC-SCNC: 10 MMOL/L
AST SERPL-CCNC: 23 U/L
BILIRUB SERPL-MCNC: 0.7 MG/DL
BUN SERPL-MCNC: 16 MG/DL
CALCIUM SERPL-MCNC: 8.8 MG/DL
CHLORIDE SERPL-SCNC: 106 MMOL/L
CHOLEST SERPL-MCNC: 172 MG/DL
CO2 SERPL-SCNC: 26 MMOL/L
CREAT SERPL-MCNC: 1.09 MG/DL
EGFR: 74 ML/MIN/1.73M2
ESTIMATED AVERAGE GLUCOSE: 111 MG/DL
GLUCOSE SERPL-MCNC: 104 MG/DL
HBA1C MFR BLD HPLC: 5.5 %
HCT VFR BLD CALC: 45.6 %
HDLC SERPL-MCNC: 58 MG/DL
HGB BLD-MCNC: 14.8 G/DL
LDLC SERPL CALC-MCNC: 101 MG/DL
MCHC RBC-ENTMCNC: 29.7 PG
MCHC RBC-ENTMCNC: 32.5 GM/DL
MCV RBC AUTO: 91.4 FL
NONHDLC SERPL-MCNC: 114 MG/DL
PLATELET # BLD AUTO: 240 K/UL
POTASSIUM SERPL-SCNC: 4.7 MMOL/L
PROT SERPL-MCNC: 6.7 G/DL
RBC # BLD: 4.99 M/UL
RBC # FLD: 12.8 %
SODIUM SERPL-SCNC: 142 MMOL/L
TRIGL SERPL-MCNC: 71 MG/DL
TSH SERPL-ACNC: 1.49 UIU/ML
WBC # FLD AUTO: 6.1 K/UL

## 2024-09-23 ENCOUNTER — APPOINTMENT (OUTPATIENT)
Dept: CARDIOLOGY | Facility: CLINIC | Age: 69
End: 2024-09-23
Payer: COMMERCIAL

## 2024-09-23 PROCEDURE — 93306 TTE W/DOPPLER COMPLETE: CPT

## 2024-09-26 ENCOUNTER — RESULT REVIEW (OUTPATIENT)
Age: 69
End: 2024-09-26

## 2024-09-27 ENCOUNTER — APPOINTMENT (OUTPATIENT)
Dept: CV DIAGNOSTICS | Facility: HOSPITAL | Age: 69
End: 2024-09-27

## 2024-09-27 ENCOUNTER — OUTPATIENT (OUTPATIENT)
Dept: OUTPATIENT SERVICES | Facility: HOSPITAL | Age: 69
LOS: 1 days | End: 2024-09-27
Payer: COMMERCIAL

## 2024-09-27 DIAGNOSIS — R07.9 CHEST PAIN, UNSPECIFIED: ICD-10-CM

## 2024-09-27 PROCEDURE — 93016 CV STRESS TEST SUPVJ ONLY: CPT

## 2024-09-27 PROCEDURE — 93018 CV STRESS TEST I&R ONLY: CPT

## 2024-09-27 PROCEDURE — 93017 CV STRESS TEST TRACING ONLY: CPT

## 2024-11-06 ENCOUNTER — APPOINTMENT (OUTPATIENT)
Dept: PHARMACY | Facility: CLINIC | Age: 69
End: 2024-11-06
Payer: SELF-PAY

## 2024-11-06 PROCEDURE — V5299A: CUSTOM

## 2024-11-18 ENCOUNTER — APPOINTMENT (OUTPATIENT)
Dept: OPHTHALMOLOGY | Facility: CLINIC | Age: 69
End: 2024-11-18
Payer: COMMERCIAL

## 2024-11-18 ENCOUNTER — NON-APPOINTMENT (OUTPATIENT)
Age: 69
End: 2024-11-18

## 2024-11-18 PROCEDURE — 92083 EXTENDED VISUAL FIELD XM: CPT

## 2024-11-18 PROCEDURE — 92012 INTRM OPH EXAM EST PATIENT: CPT

## 2024-11-25 ENCOUNTER — APPOINTMENT (OUTPATIENT)
Dept: CARDIOLOGY | Facility: CLINIC | Age: 69
End: 2024-11-25

## 2025-02-20 ENCOUNTER — APPOINTMENT (OUTPATIENT)
Dept: OTOLARYNGOLOGY | Facility: CLINIC | Age: 70
End: 2025-02-20

## 2025-02-20 PROCEDURE — 92567 TYMPANOMETRY: CPT

## 2025-02-20 PROCEDURE — 92557 COMPREHENSIVE HEARING TEST: CPT

## 2025-03-07 ENCOUNTER — APPOINTMENT (OUTPATIENT)
Dept: PULMONOLOGY | Facility: CLINIC | Age: 70
End: 2025-03-07

## 2025-04-02 ENCOUNTER — APPOINTMENT (OUTPATIENT)
Dept: PHARMACY | Facility: CLINIC | Age: 70
End: 2025-04-02
Payer: SELF-PAY

## 2025-04-02 ENCOUNTER — APPOINTMENT (OUTPATIENT)
Dept: OTOLARYNGOLOGY | Facility: CLINIC | Age: 70
End: 2025-04-02

## 2025-04-02 PROCEDURE — V5299A: CUSTOM

## 2025-04-02 PROCEDURE — 92567 TYMPANOMETRY: CPT

## 2025-04-02 PROCEDURE — 92557 COMPREHENSIVE HEARING TEST: CPT

## 2025-04-02 PROCEDURE — 99213 OFFICE O/P EST LOW 20 MIN: CPT

## 2025-04-19 NOTE — PROCEDURE
[FreeTextEntry1] : Feno was 21; a normal value being less than 25. Fractional exhaled nitric oxide (FENO) is regarded as a simple, noninvasive method for assessing eosinophilic airway inflammation. Produced by a variety of cells within the lung, nitric oxide (NO) concentrations are generally low in healthy individuals. However, high concentrations of NO appear to be involved in nonspecific host defense mechanisms and chronic inflammatory  diseases such as asthma. The American Thor[employer] Society (ATS) therefore recommended using FENO to aid in the diagnosis and monitoring of eosinophilic airway inflammation and asthma, and for identifying steroid responsive individuals whose chronic respiratory symptoms may be caused by airway inflammation \par \par PFT revealed normal flows, with a FEV1 of 3.24L, which is 98% of predicted, with a abnormal inspiratory limb\par \par CXR revealed a normal sized heart; there was no evidence of definitive infiltrate or effusion -- A normal Appearing chest X-Ray \par \par (2017) CXR revealed a normal sized heart; there was no evidence of infiltrate or effusion -- A normal appearing chest radiograph \par \par (2007) CXR revealed a normal sized heart; there was no evidence of infiltrate or effusion -- A normal appearing chest radiograph \par   No

## 2025-04-26 PROBLEM — H93.292 IMPAIRMENT OF AUDITORY DISCRIMINATION OF LEFT EAR: Status: ACTIVE | Noted: 2025-04-26

## 2025-05-05 ENCOUNTER — APPOINTMENT (OUTPATIENT)
Dept: OTOLARYNGOLOGY | Facility: CLINIC | Age: 70
End: 2025-05-05

## 2025-05-19 ENCOUNTER — APPOINTMENT (OUTPATIENT)
Dept: OPHTHALMOLOGY | Facility: CLINIC | Age: 70
End: 2025-05-19
Payer: COMMERCIAL

## 2025-05-19 ENCOUNTER — NON-APPOINTMENT (OUTPATIENT)
Age: 70
End: 2025-05-19

## 2025-05-19 PROCEDURE — 92133 CPTRZD OPH DX IMG PST SGM ON: CPT

## 2025-05-19 PROCEDURE — 92083 EXTENDED VISUAL FIELD XM: CPT

## 2025-05-19 PROCEDURE — 92014 COMPRE OPH EXAM EST PT 1/>: CPT

## 2025-06-09 ENCOUNTER — NON-APPOINTMENT (OUTPATIENT)
Age: 70
End: 2025-06-09

## 2025-06-09 ENCOUNTER — APPOINTMENT (OUTPATIENT)
Dept: CARDIOLOGY | Facility: CLINIC | Age: 70
End: 2025-06-09

## 2025-06-09 VITALS
OXYGEN SATURATION: 99 % | HEART RATE: 67 BPM | WEIGHT: 170 LBS | DIASTOLIC BLOOD PRESSURE: 80 MMHG | SYSTOLIC BLOOD PRESSURE: 117 MMHG | BODY MASS INDEX: 24.39 KG/M2

## 2025-06-09 VITALS — WEIGHT: 156 LBS | BODY MASS INDEX: 22.38 KG/M2

## 2025-06-09 PROCEDURE — 93000 ELECTROCARDIOGRAM COMPLETE: CPT

## 2025-06-09 PROCEDURE — 99215 OFFICE O/P EST HI 40 MIN: CPT

## 2025-07-29 ENCOUNTER — APPOINTMENT (OUTPATIENT)
Dept: NEUROLOGY | Facility: CLINIC | Age: 70
End: 2025-07-29

## 2025-08-20 ENCOUNTER — APPOINTMENT (OUTPATIENT)
Dept: PHARMACY | Facility: CLINIC | Age: 70
End: 2025-08-20
Payer: SELF-PAY

## 2025-08-20 PROCEDURE — V5267C: CUSTOM

## 2025-08-20 PROCEDURE — V5299A: CUSTOM

## 2025-08-20 PROCEDURE — V5267D: CUSTOM
